# Patient Record
Sex: MALE | Race: BLACK OR AFRICAN AMERICAN | NOT HISPANIC OR LATINO | Employment: FULL TIME | URBAN - METROPOLITAN AREA
[De-identification: names, ages, dates, MRNs, and addresses within clinical notes are randomized per-mention and may not be internally consistent; named-entity substitution may affect disease eponyms.]

---

## 2020-09-26 PROCEDURE — 99285 EMERGENCY DEPT VISIT HI MDM: CPT | Mod: ,,, | Performed by: EMERGENCY MEDICINE

## 2020-09-26 PROCEDURE — 99285 EMERGENCY DEPT VISIT HI MDM: CPT | Mod: 25

## 2020-09-26 PROCEDURE — 99285 PR EMERGENCY DEPT VISIT,LEVEL V: ICD-10-PCS | Mod: ,,, | Performed by: EMERGENCY MEDICINE

## 2020-09-27 ENCOUNTER — HOSPITAL ENCOUNTER (OUTPATIENT)
Facility: HOSPITAL | Age: 45
Discharge: HOME | End: 2020-09-28
Attending: EMERGENCY MEDICINE | Admitting: EMERGENCY MEDICINE
Payer: COMMERCIAL

## 2020-09-27 DIAGNOSIS — R09.02 HYPOXIA: ICD-10-CM

## 2020-09-27 DIAGNOSIS — R07.9 CHEST PAIN: ICD-10-CM

## 2020-09-27 DIAGNOSIS — R59.0 MEDIASTINAL ADENOPATHY: ICD-10-CM

## 2020-09-27 DIAGNOSIS — R06.09 DYSPNEA ON EXERTION: ICD-10-CM

## 2020-09-27 DIAGNOSIS — R06.02 SOB (SHORTNESS OF BREATH): Primary | ICD-10-CM

## 2020-09-27 PROBLEM — Z75.8 DISCHARGE PLANNING ISSUES: Status: ACTIVE | Noted: 2020-09-27

## 2020-09-27 PROBLEM — R91.8 LUNG INFILTRATE ON CT: Status: ACTIVE | Noted: 2020-09-27

## 2020-09-27 PROBLEM — Z79.899 DVT PROPHYLAXIS: Status: ACTIVE | Noted: 2020-09-27

## 2020-09-27 LAB
ALBUMIN SERPL BCP-MCNC: 3.4 G/DL (ref 3.5–5.2)
ALP SERPL-CCNC: 115 U/L (ref 55–135)
ALT SERPL W/O P-5'-P-CCNC: 53 U/L (ref 10–44)
ANION GAP SERPL CALC-SCNC: 12 MMOL/L (ref 8–16)
AST SERPL-CCNC: 47 U/L (ref 10–40)
BASOPHILS # BLD AUTO: 0.05 K/UL (ref 0–0.2)
BASOPHILS NFR BLD: 0.8 % (ref 0–1.9)
BILIRUB SERPL-MCNC: 0.3 MG/DL (ref 0.1–1)
BNP SERPL-MCNC: <10 PG/ML (ref 0–99)
BUN SERPL-MCNC: 14 MG/DL (ref 6–20)
CALCIUM SERPL-MCNC: 9 MG/DL (ref 8.7–10.5)
CALCIUM UR-MCNC: 7.6 MG/DL (ref 0–15)
CHLORIDE SERPL-SCNC: 102 MMOL/L (ref 95–110)
CO2 SERPL-SCNC: 24 MMOL/L (ref 23–29)
CREAT SERPL-MCNC: 0.9 MG/DL (ref 0.5–1.4)
CRP SERPL-MCNC: 9.1 MG/L (ref 0–8.2)
CTP QC/QA: YES
D DIMER PPP IA.FEU-MCNC: 0.7 MG/L FEU
DIFFERENTIAL METHOD: ABNORMAL
EOSINOPHIL # BLD AUTO: 0.3 K/UL (ref 0–0.5)
EOSINOPHIL NFR BLD: 5.2 % (ref 0–8)
ERYTHROCYTE [DISTWIDTH] IN BLOOD BY AUTOMATED COUNT: 13 % (ref 11.5–14.5)
ERYTHROCYTE [SEDIMENTATION RATE] IN BLOOD BY WESTERGREN METHOD: 45 MM/HR (ref 0–23)
EST. GFR  (AFRICAN AMERICAN): >60 ML/MIN/1.73 M^2
EST. GFR  (NON AFRICAN AMERICAN): >60 ML/MIN/1.73 M^2
GLUCOSE SERPL-MCNC: 110 MG/DL (ref 70–110)
HCT VFR BLD AUTO: 44 % (ref 40–54)
HGB BLD-MCNC: 14.1 G/DL (ref 14–18)
IMM GRANULOCYTES # BLD AUTO: 0.01 K/UL (ref 0–0.04)
IMM GRANULOCYTES NFR BLD AUTO: 0.2 % (ref 0–0.5)
LYMPHOCYTES # BLD AUTO: 1.2 K/UL (ref 1–4.8)
LYMPHOCYTES NFR BLD: 18.4 % (ref 18–48)
MCH RBC QN AUTO: 26.9 PG (ref 27–31)
MCHC RBC AUTO-ENTMCNC: 32 G/DL (ref 32–36)
MCV RBC AUTO: 84 FL (ref 82–98)
MONOCYTES # BLD AUTO: 0.8 K/UL (ref 0.3–1)
MONOCYTES NFR BLD: 13.3 % (ref 4–15)
NEUTROPHILS # BLD AUTO: 3.9 K/UL (ref 1.8–7.7)
NEUTROPHILS NFR BLD: 62.1 % (ref 38–73)
NRBC BLD-RTO: 0 /100 WBC
PLATELET # BLD AUTO: 364 K/UL (ref 150–350)
PMV BLD AUTO: 9.6 FL (ref 9.2–12.9)
POCT GLUCOSE: 88 MG/DL (ref 70–110)
POTASSIUM SERPL-SCNC: 4.4 MMOL/L (ref 3.5–5.1)
PROT SERPL-MCNC: 7.6 G/DL (ref 6–8.4)
RBC # BLD AUTO: 5.25 M/UL (ref 4.6–6.2)
SARS-COV-2 RDRP RESP QL NAA+PROBE: NEGATIVE
SODIUM SERPL-SCNC: 138 MMOL/L (ref 136–145)
TROPONIN I SERPL DL<=0.01 NG/ML-MCNC: <0.006 NG/ML (ref 0–0.03)
WBC # BLD AUTO: 6.3 K/UL (ref 3.9–12.7)

## 2020-09-27 PROCEDURE — U0002 COVID-19 LAB TEST NON-CDC: HCPCS | Performed by: STUDENT IN AN ORGANIZED HEALTH CARE EDUCATION/TRAINING PROGRAM

## 2020-09-27 PROCEDURE — 25500020 PHARM REV CODE 255: Performed by: EMERGENCY MEDICINE

## 2020-09-27 PROCEDURE — 85379 FIBRIN DEGRADATION QUANT: CPT

## 2020-09-27 PROCEDURE — 99220 PR INITIAL OBSERVATION CARE,LEVL III: CPT | Mod: ,,, | Performed by: HOSPITALIST

## 2020-09-27 PROCEDURE — 87040 BLOOD CULTURE FOR BACTERIA: CPT

## 2020-09-27 PROCEDURE — G0378 HOSPITAL OBSERVATION PER HR: HCPCS

## 2020-09-27 PROCEDURE — 96372 THER/PROPH/DIAG INJ SC/IM: CPT | Mod: 59

## 2020-09-27 PROCEDURE — 93010 ELECTROCARDIOGRAM REPORT: CPT | Mod: ,,, | Performed by: INTERNAL MEDICINE

## 2020-09-27 PROCEDURE — 99203 PR OFFICE/OUTPT VISIT, NEW, LEVL III, 30-44 MIN: ICD-10-PCS | Mod: ,,, | Performed by: INTERNAL MEDICINE

## 2020-09-27 PROCEDURE — 99220 PR INITIAL OBSERVATION CARE,LEVL III: ICD-10-PCS | Mod: ,,, | Performed by: HOSPITALIST

## 2020-09-27 PROCEDURE — 82164 ANGIOTENSIN I ENZYME TEST: CPT

## 2020-09-27 PROCEDURE — 85652 RBC SED RATE AUTOMATED: CPT

## 2020-09-27 PROCEDURE — 82340 ASSAY OF CALCIUM IN URINE: CPT

## 2020-09-27 PROCEDURE — 93005 ELECTROCARDIOGRAM TRACING: CPT

## 2020-09-27 PROCEDURE — 86140 C-REACTIVE PROTEIN: CPT

## 2020-09-27 PROCEDURE — 63600175 PHARM REV CODE 636 W HCPCS: Performed by: STUDENT IN AN ORGANIZED HEALTH CARE EDUCATION/TRAINING PROGRAM

## 2020-09-27 PROCEDURE — 99203 OFFICE O/P NEW LOW 30 MIN: CPT | Mod: ,,, | Performed by: INTERNAL MEDICINE

## 2020-09-27 PROCEDURE — 80053 COMPREHEN METABOLIC PANEL: CPT

## 2020-09-27 PROCEDURE — 86703 HIV-1/HIV-2 1 RESULT ANTBDY: CPT

## 2020-09-27 PROCEDURE — 84484 ASSAY OF TROPONIN QUANT: CPT

## 2020-09-27 PROCEDURE — 86038 ANTINUCLEAR ANTIBODIES: CPT

## 2020-09-27 PROCEDURE — 83880 ASSAY OF NATRIURETIC PEPTIDE: CPT

## 2020-09-27 PROCEDURE — 93010 EKG 12-LEAD: ICD-10-PCS | Mod: ,,, | Performed by: INTERNAL MEDICINE

## 2020-09-27 PROCEDURE — 85025 COMPLETE CBC W/AUTO DIFF WBC: CPT

## 2020-09-27 RX ORDER — ALBUTEROL SULFATE 90 UG/1
2 AEROSOL, METERED RESPIRATORY (INHALATION) EVERY 4 HOURS PRN
Status: DISCONTINUED | OUTPATIENT
Start: 2020-09-27 | End: 2020-09-28 | Stop reason: HOSPADM

## 2020-09-27 RX ORDER — ENOXAPARIN SODIUM 100 MG/ML
40 INJECTION SUBCUTANEOUS EVERY 24 HOURS
Status: DISCONTINUED | OUTPATIENT
Start: 2020-09-27 | End: 2020-09-28 | Stop reason: HOSPADM

## 2020-09-27 RX ORDER — ALBUTEROL SULFATE 90 UG/1
2 AEROSOL, METERED RESPIRATORY (INHALATION) EVERY 8 HOURS
Status: DISCONTINUED | OUTPATIENT
Start: 2020-09-27 | End: 2020-09-27

## 2020-09-27 RX ORDER — GLUCAGON 1 MG
1 KIT INJECTION
Status: DISCONTINUED | OUTPATIENT
Start: 2020-09-27 | End: 2020-09-28 | Stop reason: HOSPADM

## 2020-09-27 RX ORDER — SODIUM CHLORIDE 0.9 % (FLUSH) 0.9 %
10 SYRINGE (ML) INJECTION
Status: DISCONTINUED | OUTPATIENT
Start: 2020-09-27 | End: 2020-09-28 | Stop reason: HOSPADM

## 2020-09-27 RX ORDER — IBUPROFEN 200 MG
16 TABLET ORAL
Status: DISCONTINUED | OUTPATIENT
Start: 2020-09-27 | End: 2020-09-28 | Stop reason: HOSPADM

## 2020-09-27 RX ORDER — IBUPROFEN 200 MG
24 TABLET ORAL
Status: DISCONTINUED | OUTPATIENT
Start: 2020-09-27 | End: 2020-09-28 | Stop reason: HOSPADM

## 2020-09-27 RX ADMIN — IOHEXOL 75 ML: 350 INJECTION, SOLUTION INTRAVENOUS at 01:09

## 2020-09-27 RX ADMIN — ENOXAPARIN SODIUM 40 MG: 40 INJECTION SUBCUTANEOUS at 05:09

## 2020-09-27 NOTE — ED NOTES
Pt transported to room 603 A on tele box with escort per stretcher  Pt condition stable on leaving the ED, pt belongings are with pt , and pt's wife notified of room number

## 2020-09-27 NOTE — ED PROVIDER NOTES
Encounter Date: 9/26/2020       History     Chief Complaint   Patient presents with    Cough     Pt c/o worsening dry cough and SOB for 1.5 weeks. He was seen in urgent care 9/14 and prescribed steroids and azithromycin (he finished doses). He tested neg for COVID at that time.  Highest temp today 99.3. Denies anti-pyretic use PTAJennifer Gutierrez Jr. is a 45 y.o. male  With no previous past medical history presenting with 2 weeks of dyspnea on exertion and chest tightness.  He reports 2 weeks ago he was seen at urgent care for symptoms and was prescribed with azithromycin and a Medrol Dosepak.  He states that his symptoms have failed to improve over the past 2 weeks and he was seen a tele triage nurse tonight who told him based on his breathing pattern that he needs to report to the ED immediately.  He states that his dyspnea is worse on exertion and he develops weakness and tingling in his extremities when he continues to exert himself.  Prior to 2 weeks ago he states that he was perfectly healthy with no previous episodes of these symptoms.  He is very active and works at [employer].   He states he does not take any daily medicines.  He does have negative for COVID 2 weeks ago in urgent care.  States T-max at home 99.4 earlier today.  He endorses night sweats.  Denies nausea /vomiting, headaches, lower extremity swelling.  Denies palpitations or vision changes.        Review of patient's allergies indicates:  No Known Allergies  History reviewed. No pertinent past medical history.  History reviewed. No pertinent surgical history.  History reviewed. No pertinent family history.  Social History     Tobacco Use    Smoking status: Never Smoker   Substance Use Topics    Alcohol use: Never     Frequency: Never    Drug use: Not on file     Review of Systems   Constitutional: Positive for activity change and fatigue. Negative for chills and fever.   HENT: Negative for congestion, rhinorrhea, sneezing and sore throat.     Eyes: Negative for photophobia and visual disturbance.   Respiratory: Positive for chest tightness and shortness of breath. Negative for cough and wheezing.    Cardiovascular: Negative for palpitations and leg swelling.   Gastrointestinal: Negative for blood in stool, diarrhea, nausea and vomiting.   Endocrine: Negative for polydipsia and polyphagia.   Genitourinary: Negative for dysuria and flank pain.   Musculoskeletal: Negative for arthralgias and back pain.   Skin: Negative for rash and wound.   Allergic/Immunologic: Negative for immunocompromised state.   Neurological: Negative for dizziness, syncope, facial asymmetry, speech difficulty and headaches.   Hematological: Does not bruise/bleed easily.   Psychiatric/Behavioral: Negative for agitation and behavioral problems.       Physical Exam     Initial Vitals [09/26/20 2355]   BP Pulse Resp Temp SpO2   128/76 89 18 98.3 °F (36.8 °C) (!) 94 %      MAP       --         Physical Exam    Nursing note and vitals reviewed.  Constitutional: He appears well-developed and well-nourished. He is not diaphoretic.   HENT:   Mouth/Throat: Oropharynx is clear and moist. No oropharyngeal exudate.   Eyes: EOM are normal. Pupils are equal, round, and reactive to light. No scleral icterus.   Neck: Normal range of motion. Neck supple.   Cardiovascular: Normal rate and regular rhythm.   No murmur heard.  No JVD.    Pulmonary/Chest: Breath sounds normal. No accessory muscle usage or stridor. Tachypnea noted. No respiratory distress. He has no wheezes. He has no rhonchi. He has no rales. He exhibits no tenderness.   Abdominal: Soft. Bowel sounds are normal. He exhibits no distension. There is no abdominal tenderness. There is no rebound.   Musculoskeletal: Normal range of motion. No tenderness or edema.   Neurological: He is alert and oriented to person, place, and time. He has normal strength. No sensory deficit. GCS score is 15. GCS eye subscore is 4. GCS verbal subscore is 5. GCS  motor subscore is 6.   Skin: Skin is warm and dry. Capillary refill takes less than 2 seconds. No pallor.   Psychiatric: He has a normal mood and affect. His behavior is normal. Judgment and thought content normal.         ED Course   Procedures  Labs Reviewed   CBC W/ AUTO DIFFERENTIAL - Abnormal; Notable for the following components:       Result Value    Mean Corpuscular Hemoglobin 26.9 (*)     Platelets 364 (*)     All other components within normal limits   COMPREHENSIVE METABOLIC PANEL - Abnormal; Notable for the following components:    Albumin 3.4 (*)     AST 47 (*)     ALT 53 (*)     All other components within normal limits   D DIMER, QUANTITATIVE - Abnormal; Notable for the following components:    D-Dimer 0.70 (*)     All other components within normal limits   SEDIMENTATION RATE - Abnormal; Notable for the following components:    Sed Rate 45 (*)     All other components within normal limits   C-REACTIVE PROTEIN - Abnormal; Notable for the following components:    CRP 9.1 (*)     All other components within normal limits   TROPONIN I   B-TYPE NATRIURETIC PEPTIDE   CALCIUM, URINE, RANDOM    Narrative:     Specimen Source->Urine   ANGIOTENSIN CONVERTING ENZYME   SARS-COV-2 RDRP GENE   POCT GLUCOSE     EKG Readings: (Independently Interpreted)     Right axis deviation.  No other acute abnormalities noted.     ECG Results          EKG 12-lead (Final result)  Result time 09/27/20 10:43:36    Final result by Interface, Lab In [facility] (09/27/20 10:43:36)                 Narrative:    Test Reason : R07.9,    Vent. Rate : 091 BPM     Atrial Rate : 091 BPM     P-R Int : 146 ms          QRS Dur : 084 ms      QT Int : 364 ms       P-R-T Axes : 050 094 011 degrees     QTc Int : 447 ms    Sinus rhythm with Premature atrial complexes with Aberrant conduction  Rightward axis  Borderline Abnormal ECG  No previous ECGs available  Confirmed by Ellen Hinton MD ([id number]) on 9/27/2020 10:43:25 AM    Referred By: BLANCA    SELF           Confirmed By:Ellen Hinton MD                            Imaging Results          X-Ray Chest AP Portable (Final result)  Result time 09/27/20 05:58:52    Final result by Duane Bourne MD (09/27/20 05:58:52)                 Impression:      Bilateral pulmonary opacities consistent with interstitial and alveolar infiltrates noted, findings consistent with mediastinal and hilar adenopathy noted on CT examination noted, clinical and historical correlation and follow-up is needed, referral to the recent CT report is recommended.      Electronically signed by: Duane Bourne  Date:    09/27/2020  Time:    05:58             Narrative:    EXAMINATION:  XR CHEST AP PORTABLE    CLINICAL HISTORY:  dyspnea on exertion;    TECHNIQUE:  Single frontal view of the chest was performed.    COMPARISON:  CT examination of the chest September 27, 2020    FINDINGS:  Single chest view is submitted.  There is mild diminished depth of inspiration.  There is prominence at the level of the mediastinum and right paratracheal region that may relate to the adenopathy noted on CT examination.  Bilateral pattern of pulmonary opacities consistent with interstitial and alveolar infiltrates noted, somewhat more prominent on the right.  There is no evidence for significant pleural effusion or pneumothorax.  The visualized osseous structures appear intact.  Contrast density within the collecting structures of the kidneys and the ureters may relate to recent CT examination.                                CTA Chest Non-Coronary (PE Study) (Final result)  Result time 09/27/20 05:34:57    Final result by Marc Valadez MD (09/27/20 05:34:57)                 Impression:      1.  No convincing evidence of pulmonary thromboembolism.    2.  Abnormal bulky mediastinal and bilateral hilar lymph node enlargement of uncertain etiology.  Metastatic or lymphoproliferative process must be excluded.  Additional alternative considerations  include autoimmune disorder such as sarcoidosis or reactive lymphadenopathy.    3.  Abnormal appearance of the lungs demonstrating extensive diffuse bilateral micronodular opacities with somewhat more confluent bibasilar consolidation and ground-glass attenuation.  Findings may relate to infectious process, including atypical infection, edema, noninfectious inflammatory process, or neoplastic process (i.e.  lymphangitic carcinomatosis).  Pulmonary consultation advised in light of aforementioned pulmonary findings and significant adenopathy.  Follow-up assessment with dedicated noncontrast high-resolution chest CT may also be of benefit further characterization of lung parenchyma.    This report was flagged in Epic as abnormal.      Electronically signed by: Marc Valadez MD  Date:    09/27/2020  Time:    05:34             Narrative:    EXAMINATION:  CTA CHEST NON CORONARY    CLINICAL HISTORY:  PE suspected, intermediate prob, positive D-dimer;    TECHNIQUE:  Low dose axial images, sagittal and coronal reformations were obtained from the thoracic inlet to the lung bases following the IV administration of 75 mL of Omnipaque 350.  Contrast timing was optimized to evaluate the pulmonary arteries.  MIP images were performed.    COMPARISON:  None    FINDINGS:  The visualized soft tissue structures at the base of the neck are unremarkable.    The thoracic aorta maintains normal caliber, contour, and course without significant atherosclerotic calcification within its course.  There is no evidence of aneurysmal dilation or dissection. The heart is not enlarged and there is no evidence of pericardial effusion.The esophagus maintains a normal course and caliber.There are multiple abnormally enlarged mediastinal and bilateral hilar lymph node enlargement.  For example, there is an enlarged 2.0 cm right pretracheal lymph node (axial series 3, image 31) and an enlarged left hilar lymph node or a conglomeration of lymph nodes  measuring 3.1 cm (axial series 3, image 41).  No bulky axillary lymph node enlargement appreciated.    The trachea is midline and proximal airways are patent. Please note detailed assessment of the lung parenchyma is limited by respiratory motion and CTA technique.  The lungs are markedly abnormal in appearance.  There are extensive bilateral micronodular opacities with more confluent bibasilar consolidation, most pronounced at the right lung base.  There are superimposed diffuse ground-glass opacities with a bibasilar predominance.  There is a more focal 0.7 cm left upper lobe nodular opacity (axial series 3, image 30) as well as an additional more discrete 1.0 cm nodular opacity abutting the fissure within the left lower lobe (axial series 3, image 49).  There is no pneumothorax.  There is no significant pleural effusion.    There is no convincing evidence of pulmonary thromboembolism.  There is mass effect upon the left upper lobe are artery secondary to bulky hilar lymph node enlargement.    Limited images of the upper abdomen obtained during the course of this dedicated thoracic CT demonstrate no acute abnormalities.  No definite bulky adenopathy identified in the visualized upper abdomen.    The osseous structures are unremarkable.                                 Medical Decision Making:   History:   Old Medical Records: I decided to obtain old medical records.  Old Records Summarized: records from clinic visits and records from previous admission(s).       <> Summary of Records: COVID neg on 9/16  Initial Assessment:   Quinton Gutierrez Jr. is a 45 y.o. male  With no previous past medical history presenting with 2 weeks of dyspnea on exertion and chest tightness.   Differential Diagnosis:   Pneumonia  NSTEMI  PE  CHF   Pleural effusion  Independently Interpreted Test(s):   I have ordered and independently interpreted X-rays - see summary below.       <> Summary of X-Ray Reading(s): Bilateral PNA  I have ordered and  independently interpreted EKG Reading(s) - see summary below       <> Summary of EKG Reading(s): Sinus 91 with PACs, no ischemic changes  Clinical Tests:   Lab Tests: Ordered and Reviewed  Radiological Study: Ordered and Reviewed  Medical Tests: Ordered and Reviewed  ED Management:  Upon presentation to the ED patient was hemodynamically stable.  Satting at 94% on room air and clearly tachypneic.  Patient seen and examined at bedside.  Wife provides majority of collateral history due to patient becoming short of breath with conversation.  Workup initiated for dyspnea -chest x-ray, repeat COVID testing, CBC, CMP, D-dimer to rule out PE.      Update:  Troponin and BNP negative.  D-dimer elevated, CTA chest ordered.   Patient was walked in the hallway with O2 monitor - O2 sats dropped from 93% to 89% while walking 10 m. Further care signed out to Dr. Ashford and Dr. Aly.    PGY4 UPDATE:  CTA shows no PE. There is significant reticulonodular disease with a long differential including inflammatory process (atypical viral infection), autoimmune, or miliary TB. He is sating mid 90s on RA at rest and is comfortable. With ambulation he does desat to upper 80s. Hosp med consulted for admission and inpt pulm consult. TB precautions discussed with RN and admitting team.    Yovanny Aly MD  Resident, PGY-4  9/29/2020 5:22 AM                Attending Attestation:   Physician Attestation Statement for Resident:  As the supervising MD   Physician Attestation Statement: I have personally seen and examined this patient.   I agree with the above history. -:   As the supervising MD I agree with the above PE.   -: NAD, VSS, no LAD, faint scattered rhonchi b/l but no dyspnea, RRR no mrg, no LE edema   As the supervising MD I agree with the above treatment, course, plan, and disposition.  I have reviewed and agree with the residents interpretation of the following: lab data, CT scans and x-rays.  I have reviewed the following:  old records at this facility.            MDM Complexity: HIGH                  Clinical Impression:       ICD-10-CM ICD-9-CM   1. SOB (shortness of breath)  R06.02 786.05   2. Dyspnea on exertion  R06.09 786.09   3. Chest pain  R07.9 786.50   4. Mediastinal adenopathy  R59.0 785.6   5. Hypoxia  R09.02 799.02                      Disposition:   Disposition: Admitted  Condition: Fair     ED Disposition Condition    Observation                             Yovanny Aly MD  Resident  09/27/20 0523       Teagan Ashford MD  09/29/20 0013

## 2020-09-27 NOTE — SUBJECTIVE & OBJECTIVE
History reviewed. No pertinent past medical history.    History reviewed. No pertinent surgical history.    Review of patient's allergies indicates:  No Known Allergies    Family History     None        Tobacco Use    Smoking status: Never Smoker   Substance and Sexual Activity    Alcohol use: Never     Frequency: Never    Drug use: Not on file    Sexual activity: Not on file         Review of Systems   Constitutional: Positive for activity change, chills, diaphoresis and unexpected weight change.   HENT: Negative.    Eyes: Negative.    Respiratory: Positive for cough and shortness of breath.    Cardiovascular: Negative.    Gastrointestinal: Negative.    Endocrine: Negative.    Genitourinary: Negative.    Musculoskeletal: Negative.    Allergic/Immunologic: Negative.    Neurological: Negative.    Hematological: Negative.    Psychiatric/Behavioral: Negative.      Objective:     Vital Signs (Most Recent):  Temp: 98.4 °F (36.9 °C) (09/27/20 1419)  Pulse: 68 (09/27/20 1419)  Resp: 16 (09/27/20 1419)  BP: 119/74 (09/27/20 1419)  SpO2: (!) 94 % (09/27/20 1419) Vital Signs (24h Range):  Temp:  [97.8 °F (36.6 °C)-99.2 °F (37.3 °C)] 98.4 °F (36.9 °C)  Pulse:  [67-89] 68  Resp:  [16-24] 16  SpO2:  [93 %-96 %] 94 %  BP: (110-133)/(69-81) 119/74     Weight: 70.8 kg (156 lb)  Body mass index is 25.18 kg/m².      Intake/Output Summary (Last 24 hours) at 9/27/2020 1448  Last data filed at 9/27/2020 1445  Gross per 24 hour   Intake --   Output 275 ml   Net -275 ml       Physical Exam  Constitutional:       General: He is not in acute distress.     Appearance: Normal appearance.   HENT:      Head: Atraumatic.      Nose: Nose normal. No congestion.      Mouth/Throat:      Mouth: Mucous membranes are moist.      Pharynx: No oropharyngeal exudate or posterior oropharyngeal erythema.   Eyes:      General: No scleral icterus.     Conjunctiva/sclera: Conjunctivae normal.   Neck:      Musculoskeletal: Normal range of motion. No muscular  tenderness.   Cardiovascular:      Rate and Rhythm: Regular rhythm.   Pulmonary:      Effort: Pulmonary effort is normal. No respiratory distress.      Breath sounds: Rhonchi present. No rales.      Comments: Diffuse crackles/rhonchi throughout lung fields  Abdominal:      General: Abdomen is flat.      Palpations: Abdomen is soft.   Musculoskeletal: Normal range of motion.         General: No swelling or deformity.   Lymphadenopathy:      Cervical: No cervical adenopathy.   Skin:     General: Skin is warm and dry.      Capillary Refill: Capillary refill takes less than 2 seconds.      Findings: No rash.   Neurological:      General: No focal deficit present.      Mental Status: He is alert and oriented to person, place, and time.   Psychiatric:         Mood and Affect: Mood normal.         Behavior: Behavior normal.       Significant Labs:    CBC/Anemia Profile:  Recent Labs   Lab 09/27/20  0048   WBC 6.30   HGB 14.1   HCT 44.0   *   MCV 84   RDW 13.0        Chemistries:  Recent Labs   Lab 09/27/20 0048      K 4.4      CO2 24   BUN 14   CREATININE 0.9   CALCIUM 9.0   ALBUMIN 3.4*   PROT 7.6   BILITOT 0.3   ALKPHOS 115   ALT 53*   AST 47*       Significant Imaging:   reviewed

## 2020-09-27 NOTE — CONSULTS
[facility]  Pulmonology  Consult Note    Patient Name: Quinton Gutierrez Jr.  MRN: [id number]  Admission Date: 9/27/2020  Hospital Length of Stay: 0 days  Code Status: Full Code  Attending Physician: No att. providers found  Primary Care Provider: Primary Doctor No   Principal Problem: Lung infiltrate on CT    Inpatient consult to Pulmonology  Consult performed by: Maureen Mahoney MD  Consult ordered by: Gemini Herrera MD        Subjective:     HPI:  Mr. Gutierrez is a 46 yo M with no significant past medical history who presented to the ED with cough, night sweats and ross loss of 40 lbs. Symptoms have been worsening over the past few months. He denies any family hx of cancer, denies any risk factors for TB (known close contact, known HIV, time incarcerated, time spent in a shelter), denies any smoking history, denies inhalational exposures at work or at home. 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 His COVID-19 test has been negative 2x recently, and a CT done in the ED showed extensive diffuse bilateral micronodular opacities with somewhat more confluent bibasilar consolidation and ground-glass attenuation, as well as bulky mediastinal and hilar LAD. His vitals were significant for sats of 95% on RA, labs notable for grossly normal CBC/CMP, with elevated ESR/CRP.     History reviewed. No pertinent past medical history.    History reviewed. No pertinent surgical history.    Review of patient's allergies indicates:  No Known Allergies    Family History     None        Tobacco Use    Smoking status: Never Smoker   Substance and Sexual Activity    Alcohol use: Never     Frequency: Never    Drug use: Not on file    Sexual activity: Not on file         Review of Systems   Constitutional: Positive for activity change, chills, diaphoresis and unexpected weight change.   HENT: Negative.    Eyes: Negative.    Respiratory: Positive for  cough and shortness of breath.    Cardiovascular: Negative.    Gastrointestinal: Negative.    Endocrine: Negative.    Genitourinary: Negative.    Musculoskeletal: Negative.    Allergic/Immunologic: Negative.    Neurological: Negative.    Hematological: Negative.    Psychiatric/Behavioral: Negative.      Objective:     Vital Signs (Most Recent):  Temp: 98.4 °F (36.9 °C) (09/27/20 1419)  Pulse: 68 (09/27/20 1419)  Resp: 16 (09/27/20 1419)  BP: 119/74 (09/27/20 1419)  SpO2: (!) 94 % (09/27/20 1419) Vital Signs (24h Range):  Temp:  [97.8 °F (36.6 °C)-99.2 °F (37.3 °C)] 98.4 °F (36.9 °C)  Pulse:  [67-89] 68  Resp:  [16-24] 16  SpO2:  [93 %-96 %] 94 %  BP: (110-133)/(69-81) 119/74     Weight: 70.8 kg (156 lb)  Body mass index is 25.18 kg/m².      Intake/Output Summary (Last 24 hours) at 9/27/2020 1448  Last data filed at 9/27/2020 1445  Gross per 24 hour   Intake --   Output 275 ml   Net -275 ml       Physical Exam  Constitutional:       General: He is not in acute distress.     Appearance: Normal appearance.   HENT:      Head: Atraumatic.      Nose: Nose normal. No congestion.      Mouth/Throat:      Mouth: Mucous membranes are moist.      Pharynx: No oropharyngeal exudate or posterior oropharyngeal erythema.   Eyes:      General: No scleral icterus.     Conjunctiva/sclera: Conjunctivae normal.   Neck:      Musculoskeletal: Normal range of motion. No muscular tenderness.   Cardiovascular:      Rate and Rhythm: Regular rhythm.   Pulmonary:      Effort: Pulmonary effort is normal. No respiratory distress.      Breath sounds: Rhonchi present. No rales.      Comments: Diffuse crackles/rhonchi throughout lung fields  Abdominal:      General: Abdomen is flat.      Palpations: Abdomen is soft.   Musculoskeletal: Normal range of motion.         General: No swelling or deformity.   Lymphadenopathy:      Cervical: No cervical adenopathy.   Skin:     General: Skin is warm and dry.      Capillary Refill: Capillary refill takes less  than 2 seconds.      Findings: No rash.   Neurological:      General: No focal deficit present.      Mental Status: He is alert and oriented to person, place, and time.   Psychiatric:         Mood and Affect: Mood normal.         Behavior: Behavior normal.       Significant Labs:    CBC/Anemia Profile:  Recent Labs   Lab 09/27/20  0048   WBC 6.30   HGB 14.1   HCT 44.0   *   MCV 84   RDW 13.0        Chemistries:  Recent Labs   Lab 09/27/20 0048      K 4.4      CO2 24   BUN 14   CREATININE 0.9   CALCIUM 9.0   ALBUMIN 3.4*   PROT 7.6   BILITOT 0.3   ALKPHOS 115   ALT 53*   AST 47*       Significant Imaging:   reviewed    Assessment/Plan:     No new Assessment & Plan notes have been filed under this hospital service since the last note was generated.  Service: Pulmonology    As above, Mr. Gutierrez presents with several months of worsening B-symptoms and dramatic findings on CT, concerning for infectious, malignant, or rheumatologic disease. He has no known risk factors for TB, but should be ruled out with sputum x3 and screened for HIV to identify additional risk. More concerning with his history of symptoms and CT findings is the possibility of lymphangitic carcinomatosis, from a primary malignancy such as colon, prostate, or thyroid cancer. Screening labs should be sent for these malignancies. Once AFB status is determined from sputum, our team can proceed to bronch vs EBUS to biopsy nodules and LAD. Another less likely possibility is nodular sarcoid, but this level of B symptoms would be unexpected. I discussed each of these possibilities with the patient and his wife, and showed them the areas of concern on his imaging.     Recommendations:  -rule out TB by sputum x3, MTB PCR; can order induced sputum if needed  -keep on airborne precautions   -please order HIV, PSA, TSH, CA 19-9, CEA  -once TB ruled out, we will schedule bronch vs EBUS for tissue sample and BAL      Thank you for your consult. I  will follow-up with patient. Please contact us if you have any additional questions.     Maureen Mahoney MD  Pulmonology  [facility]

## 2020-09-27 NOTE — ED NOTES
IM 5 team aware that Quantiferon Gold TB cannot be collected and processed at this time. MD verbalized understanding. No new orders at this time.

## 2020-09-27 NOTE — H&P
"[facility] Hospital Medicine  History & Physical    Patient Name: Quinton Gutierrez Jr.  MRN: [id number]  Admission Date: 9/27/2020  Attending Physician: Yaa att. providers found   Primary Care Provider: Primary Doctor Yaa    [facility] Medicine Team: [facility] MED 5 Gemini Herrera MD     Patient information was obtained from patient and ER records.     Subjective:     Principal Problem:Lung infiltrate on CT    Chief Complaint:   Chief Complaint   Patient presents with    Cough     Pt c/o worsening dry cough and SOB for 1.5 weeks. He was seen in urgent care 9/14 and prescribed steroids and azithromycin (he finished doses). He tested neg for COVID at that time.  Highest temp today 99.3. Denies anti-pyretic use PTA.         HPI: Mr Gutierrez is a 44 yo M with no significant past medical history who presents with a chief complaint of chest tightness and dyspnea on exertion. 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 These symptoms were preceded by the onset of night sweats approximately 1 month ago, which he quantifies as waking up with his shirt "pretty soaked." He also reports unintentionally losing around 40 lbs in the last month. He used to weigh 195 lbs, and now he weighs 150s despite having a good appetite and eating his normal amount (snacking throughout the day with 1 main meal at night). He also reports mild headache, diffuse body aches, and polyuria. He denies any hemoptysis, hematemesis, melena, diarrhea, unusual rashes, joint swelling, vision changes, dizziness, gait or balance issues.     He presented to an urgent care on 9/14 and was given prescription for azithromycin and steroids, which did not relieve his symptoms. COVID test was negative at that time. A visit with a telehealth nurse just prior to admission advised him to go to the ED for evaluation. He " reports he has always been very healthy, denies incarceration history, denies pertinent family history in parents, siblings, or his two children. He does not use tobacco, abuse alcohol now or in the past, or use street drugs.     History reviewed. No pertinent past medical history.    History reviewed. No pertinent surgical history.    Review of patient's allergies indicates:  No Known Allergies    No current facility-administered medications on file prior to encounter.      No current outpatient medications on file prior to encounter.     Family History     None        Tobacco Use    Smoking status: Never Smoker   Substance and Sexual Activity    Alcohol use: Never     Frequency: Never    Drug use: Not on file    Sexual activity: Not on file     Review of Systems   Constitutional: Positive for unexpected weight change. Negative for appetite change.        Positive for night sweats   HENT:        Negative for sinus infections   Eyes:        Negative for blurred vision, negative for change in vision   Respiratory: Positive for cough, chest tightness and shortness of breath.    Cardiovascular: Negative for leg swelling.   Gastrointestinal: Negative for blood in stool, constipation, diarrhea, nausea and vomiting.   Genitourinary: Positive for frequency. Negative for hematuria.   Musculoskeletal: Positive for myalgias. Negative for gait problem and joint swelling.   Skin: Negative for rash.   Neurological: Positive for headaches. Negative for dizziness.     Objective:     Vital Signs (Most Recent):  Temp: 97.8 °F (36.6 °C) (09/27/20 0735)  Pulse: 82 (09/27/20 0735)  Resp: 20 (09/27/20 0735)  BP: 131/70 (09/27/20 0735)  SpO2: 96 % (09/27/20 0735) Vital Signs (24h Range):  Temp:  [97.8 °F (36.6 °C)-99.2 °F (37.3 °C)] 97.8 °F (36.6 °C)  Pulse:  [76-89] 82  Resp:  [18-24] 20  SpO2:  [93 %-96 %] 96 %  BP: (114-131)/(69-76) 131/70     Weight: 70.8 kg (156 lb)  Body mass index is 25.18 kg/m².    Physical Exam  Vitals signs  "reviewed.   Constitutional:       General: He is not in acute distress.     Appearance: He is normal weight.   HENT:      Head: Normocephalic.      Mouth/Throat:      Mouth: Mucous membranes are moist.      Pharynx: No oropharyngeal exudate or posterior oropharyngeal erythema.   Eyes:      General: No scleral icterus.        Right eye: No discharge.         Left eye: No discharge.      Comments: Pupils equally round   Cardiovascular:      Rate and Rhythm: Normal rate and regular rhythm.      Heart sounds: No murmur.   Pulmonary:      Effort: Pulmonary effort is normal.      Breath sounds: No wheezing.      Comments: Decreased breath sounds diffusely  Abdominal:      General: There is no distension.      Palpations: Abdomen is soft.      Tenderness: There is no abdominal tenderness.   Musculoskeletal:         General: No swelling (no swelling to joints of hands) or tenderness.      Right lower leg: No edema.      Left lower leg: No edema.   Skin:     General: Skin is warm and dry.      Findings: No rash.      Comments: Fingernails normal in shape, without lesions   Neurological:      Mental Status: He is alert and oriented to person, place, and time.   Psychiatric:         Mood and Affect: Mood normal.         Behavior: Behavior normal.             Significant Labs:   CBC:   Recent Labs   Lab 09/27/20 0048   WBC 6.30   HGB 14.1   HCT 44.0   *     CMP:   Recent Labs   Lab 09/27/20 0048      K 4.4      CO2 24      BUN 14   CREATININE 0.9   CALCIUM 9.0   PROT 7.6   ALBUMIN 3.4*   BILITOT 0.3   ALKPHOS 115   AST 47*   ALT 53*   ANIONGAP 12   EGFRNONAA >60.0     Cardiac Markers:   Recent Labs   Lab 09/27/20 0048   BNP <10     Lactic Acid: No results for input(s): LACTATE in the last 48 hours.  Troponin:   Recent Labs   Lab 09/27/20 0048   TROPONINI <0.006       Significant Imaging: CXR: "Bilateral pulmonary opacities consistent with interstitial and alveolar infiltrates noted, findings " "consistent with mediastinal and hilar adenopathy noted on CT examination noted, clinical and historical correlation and follow-up is needed, referral to the recent CT report is recommended"    CT Chest PE Protocol:  "1.  No convincing evidence of pulmonary thromboembolism.     2.  Abnormal bulky mediastinal and bilateral hilar lymph node enlargement of uncertain etiology.  Metastatic or lymphoproliferative process must be excluded.  Additional alternative considerations include autoimmune disorder such as sarcoidosis or reactive lymphadenopathy.     3.  Abnormal appearance of the lungs demonstrating extensive diffuse bilateral micronodular opacities with somewhat more confluent bibasilar consolidation and ground-glass attenuation.  Findings may relate to infectious process, including atypical infection, edema, noninfectious inflammatory process, or neoplastic process (i.e.  lymphangitic carcinomatosis).  Pulmonary consultation advised in light of aforementioned pulmonary findings and significant adenopathy.  Follow-up assessment with dedicated noncontrast high-resolution chest CT may also be of benefit further characterization of lung parenchyma."    Assessment/Plan:     * Bilateral Lung infiltrate on CT  Patient with "extensive diffuse bilateral micronodular opacities with somewhat more confluent bibasilar consolidation and ground-glass attenuation." Accompanied by bilateral hilar lymphadenopathy on CXR, in the setting of recent dyspnea on exertion, dry cough, night sweats, and 40lb weightloss in 1.5 months, there is concern for systemic disease such as miliary TB or sarcoidosis. He does not appear to have TB risk factors, and no pertinent family history.   Given his lack of fever or leukocytosis, stable vital signs, low concern for pneumonia at this time, therefore deferred beginning antibiotics until further workup completed.     - NPO until evaluated by pulmonary in case bronchoscopy is warranted  - F/u COVID  - " F/u quantiferon gold, AFB sputum cultures, regular sputum cultures  - F/u JUAN, ACE, HIV, urine Ca  - Consider dedicated noncontrast high-resolution CT chest to further evaluate  - Airborne isolation until TB/COVID ruled out  - Consulted pulmonology, appreciate recs      Discharge planning issues  Patient will likely need close followup with pulmonology, ID, and/or rheumatology after discharge to complete workup for condition.       DVT prophylaxis  Lovenox 40mg subQ daily      SOB (shortness of breath)  Patient's presenting symptom was dyspnea on exertion. Maintaining appropriate O2 saturation on room air currently.   D dimer was elevated in ED to 0.7, CTA obtained and shows no evidence of PE.  BNP and Troponin wnl.     - Continuous pulse oximetry  - Will supplement O2 via nasal cannula if needed        VTE Risk Mitigation (From admission, onward)         Ordered     enoxaparin injection 40 mg  Every 24 hours      09/27/20 0514                   Gemini Herrera MD  Department of Hospital Medicine   [facility]

## 2020-09-27 NOTE — HPI
Mr. Gutierrez is a 44 yo M with no significant past medical history who presented to the ED with cough, night sweats and ross loss of 40 lbs. Symptoms have been worsening over the past few months. He denies any family hx of cancer, denies any risk factors for TB (known close contact, known HIV, time incarcerated, time spent in a shelter), denies any smoking history, denies inhalational exposures at work or at home. 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 His COVID-19 test has been negative 2x recently, and a CT done in the ED showed extensive diffuse bilateral micronodular opacities with somewhat more confluent bibasilar consolidation and ground-glass attenuation, as well as bulky mediastinal and hilar LAD. His vitals were significant for sats of 95% on RA, labs notable for grossly normal CBC/CMP, with elevated ESR/CRP.

## 2020-09-27 NOTE — ED NOTES
Pt moved to ED bed 19 at this time for negative pressure due to rule out TB status. Isolation precautions maintained. Report received from NAVJOT Bill. Care assumed. Pt changed into hospital gown. Personal belongings placed in bag at bedside and are labeled. Pt placed on continuous cardiac monitor, automated BP cuff, and continuous pulse oximeter. Oxygen saturation 94% on room air. Pt respirations even and unlabored. Pt denies feeling SOB at this time and denies CP. Pt denies complaints. Pt aware of POC for admission and all questions and concerns addressed with patient. Pt educated on need for sputum sample. Pt denies having a productive cough. Specimen container placed at bedside. Pt educated on need for urine sample. Pt reports that he voided prior to arrival to ED room 19 and does not have to void at this time. Pt aware urine sample is needed. Urinal placed at bedside. Pt oriented to call bell and verbalized he would call for assistance. Side rails raised x2, bed locked and low.

## 2020-09-27 NOTE — ED NOTES
Spoke to Myranda in lab to request lab tubes for Quantiferon Gold TB specimens. Lab stated specimens are not collected or processed on Sundays. Stated specimen will have to be collected on Monday at 0800. [facility] med 5 team paged to notify. Will inform RN assuming care at 0700.

## 2020-09-27 NOTE — ED NOTES
Coty in WAR room notified tele box 603 A was placed on pt  NSR with heart rate 75 bpm confirmed by Coty

## 2020-09-27 NOTE — ASSESSMENT & PLAN NOTE
"Patient with "extensive diffuse bilateral micronodular opacities with somewhat more confluent bibasilar consolidation and ground-glass attenuation." Accompanied by bilateral hilar lymphadenopathy on CXR, in the setting of recent dyspnea on exertion, dry cough, night sweats, and 40lb weightloss in 1.5 months, there is concern for systemic disease such as miliary TB or sarcoidosis. He does not appear to have TB risk factors, and no pertinent family history.   Given his lack of fever or leukocytosis, stable vital signs, low concern for pneumonia at this time, therefore deferred beginning antibiotics until further workup completed.     - NPO until evaluated by pulmonary in case bronchoscopy is warranted  - F/u COVID  - F/u quantiferon gold, AFB sputum cultures, regular sputum cultures  - F/u JUAN, ACE, HIV, urine Ca  - Consider dedicated noncontrast high-resolution CT chest to further evaluate  - Airborne isolation until TB/COVID ruled out  - Consulted pulmonology, appreciate recs    "

## 2020-09-27 NOTE — SUBJECTIVE & OBJECTIVE
History reviewed. No pertinent past medical history.    History reviewed. No pertinent surgical history.    Review of patient's allergies indicates:  No Known Allergies    No current facility-administered medications on file prior to encounter.      No current outpatient medications on file prior to encounter.     Family History     None        Tobacco Use    Smoking status: Never Smoker   Substance and Sexual Activity    Alcohol use: Never     Frequency: Never    Drug use: Not on file    Sexual activity: Not on file     Review of Systems   Constitutional: Positive for unexpected weight change. Negative for appetite change.        Positive for night sweats   HENT:        Negative for sinus infections   Eyes:        Negative for blurred vision, negative for change in vision   Respiratory: Positive for cough, chest tightness and shortness of breath.    Cardiovascular: Negative for leg swelling.   Gastrointestinal: Negative for blood in stool, constipation, diarrhea, nausea and vomiting.   Genitourinary: Positive for frequency. Negative for hematuria.   Musculoskeletal: Positive for myalgias. Negative for gait problem and joint swelling.   Skin: Negative for rash.   Neurological: Positive for headaches. Negative for dizziness.     Objective:     Vital Signs (Most Recent):  Temp: 97.8 °F (36.6 °C) (09/27/20 0735)  Pulse: 82 (09/27/20 0735)  Resp: 20 (09/27/20 0735)  BP: 131/70 (09/27/20 0735)  SpO2: 96 % (09/27/20 0735) Vital Signs (24h Range):  Temp:  [97.8 °F (36.6 °C)-99.2 °F (37.3 °C)] 97.8 °F (36.6 °C)  Pulse:  [76-89] 82  Resp:  [18-24] 20  SpO2:  [93 %-96 %] 96 %  BP: (114-131)/(69-76) 131/70     Weight: 70.8 kg (156 lb)  Body mass index is 25.18 kg/m².    Physical Exam  Vitals signs reviewed.   Constitutional:       General: He is not in acute distress.     Appearance: He is normal weight.   HENT:      Head: Normocephalic.      Mouth/Throat:      Mouth: Mucous membranes are moist.      Pharynx: No  "oropharyngeal exudate or posterior oropharyngeal erythema.   Eyes:      General: No scleral icterus.        Right eye: No discharge.         Left eye: No discharge.      Comments: Pupils equally round   Cardiovascular:      Rate and Rhythm: Normal rate and regular rhythm.      Heart sounds: No murmur.   Pulmonary:      Effort: Pulmonary effort is normal.      Breath sounds: No wheezing.      Comments: Decreased breath sounds diffusely  Abdominal:      General: There is no distension.      Palpations: Abdomen is soft.      Tenderness: There is no abdominal tenderness.   Musculoskeletal:         General: No swelling (no swelling to joints of hands) or tenderness.      Right lower leg: No edema.      Left lower leg: No edema.   Skin:     General: Skin is warm and dry.      Findings: No rash.      Comments: Fingernails normal in shape, without lesions   Neurological:      Mental Status: He is alert and oriented to person, place, and time.   Psychiatric:         Mood and Affect: Mood normal.         Behavior: Behavior normal.             Significant Labs:   CBC:   Recent Labs   Lab 09/27/20 0048   WBC 6.30   HGB 14.1   HCT 44.0   *     CMP:   Recent Labs   Lab 09/27/20 0048      K 4.4      CO2 24      BUN 14   CREATININE 0.9   CALCIUM 9.0   PROT 7.6   ALBUMIN 3.4*   BILITOT 0.3   ALKPHOS 115   AST 47*   ALT 53*   ANIONGAP 12   EGFRNONAA >60.0     Cardiac Markers:   Recent Labs   Lab 09/27/20 0048   BNP <10     Lactic Acid: No results for input(s): LACTATE in the last 48 hours.  Troponin:   Recent Labs   Lab 09/27/20 0048   TROPONINI <0.006       Significant Imaging: CXR: "Bilateral pulmonary opacities consistent with interstitial and alveolar infiltrates noted, findings consistent with mediastinal and hilar adenopathy noted on CT examination noted, clinical and historical correlation and follow-up is needed, referral to the recent CT report is recommended"    CT Chest PE Protocol:  "1.  No " "convincing evidence of pulmonary thromboembolism.     2.  Abnormal bulky mediastinal and bilateral hilar lymph node enlargement of uncertain etiology.  Metastatic or lymphoproliferative process must be excluded.  Additional alternative considerations include autoimmune disorder such as sarcoidosis or reactive lymphadenopathy.     3.  Abnormal appearance of the lungs demonstrating extensive diffuse bilateral micronodular opacities with somewhat more confluent bibasilar consolidation and ground-glass attenuation.  Findings may relate to infectious process, including atypical infection, edema, noninfectious inflammatory process, or neoplastic process (i.e.  lymphangitic carcinomatosis).  Pulmonary consultation advised in light of aforementioned pulmonary findings and significant adenopathy.  Follow-up assessment with dedicated noncontrast high-resolution chest CT may also be of benefit further characterization of lung parenchyma."  "

## 2020-09-27 NOTE — PLAN OF CARE
WILL CONTINUE WITH PLAN OF CARE'S INTERVENTIONS TOWARDS GOALS FOR PATIENT'S  OPTIMAL HEALTH:    NO FALLS  AIRBORNE PRECAUTIONS INITIATED  ORDERS NOTED AND IMPLEMENTED   PATIENT EDUCATION AND SUPPORT OFFERED  AWAITING QUANTIFERON GOLD TEST ON 9/28/20

## 2020-09-27 NOTE — ASSESSMENT & PLAN NOTE
Patient's presenting symptom was dyspnea on exertion. Maintaining appropriate O2 saturation on room air currently.   D dimer was elevated in ED to 0.7, CTA obtained and shows no evidence of PE.  BNP and Troponin wnl.     - Continuous pulse oximetry  - Will supplement O2 via nasal cannula if needed

## 2020-09-27 NOTE — ASSESSMENT & PLAN NOTE
Patient will likely need close followup with pulmonology, ID, and/or rheumatology after discharge to complete workup for condition.

## 2020-09-27 NOTE — ED NOTES
Pt identifiers Quinton Gutierrez JrJennifer were checked and are correct  LOC: The patient is awake, alert, aware of environment with an appropriate affect. Oriented x4, speaking appropriately  APPEARANCE: Pt resting comfortably, in no acute distress, pt is clean and well groomed, clothing properly fastened  SKIN: Skin warm, dry and intact, normal skin turgor, moist mucus membranes  RESPIRATORY: Airway is open and patent, respirations are spontaneous, even and unlabored, normal effort and rate Breath sounds clear to upper lung fields, crackles auscultated to lower lung fields   CARDIAC: Normal rate and rhythm, no peripheral edema noted, capillary refill < 3 seconds, bilateral radial pulses 2+  ABDOMEN: Soft, nontender, nondistended. Bowel sounds present   NEUROLOGIC:  facial expression is symmetrical, patient moving all extremities spontaneously, normal sensation in all extremities when touched with a finger.  Follows all commands appropriately  MUSCULOSKELETAL: No obvious deformities.

## 2020-09-27 NOTE — ED TRIAGE NOTES
Patient identifiers for Quinton Gutierrez Jr. 45 y.o. male checked and correct.  Chief Complaint   Patient presents with    Cough     Pt c/o worsening dry cough and SOB for 1.5 weeks. He was seen in urgent care 9/14 and prescribed steroids and azithromycin (he finished doses). He tested neg for COVID at that time.  Highest temp today 99.3. Denies anti-pyretic use PTA.      No past medical history on file.  Allergies reported: Review of patient's allergies indicates:  Not on File      LOC: Patient is awake, alert, and aware of environment with an appropriate affect. Patient is oriented x 3 and speaking appropriately.  APPEARANCE: Patient resting comfortably and in no acute distress. Patient is clean and well groomed, patient's clothing is properly fastened.  HEENT: Pt reports cough  SKIN: The skin is warm and dry. Patient has normal skin turgor and moist mucus membranes. Skin is intact; no bruising or breakdown noted.  MUSKULOSKELETAL: Patient is moving all extremities well, no obvious deformities noted. Pulses intact.   RESPIRATORY: Airway is open and patent. Respirations are spontaneous and non-labored with normal effort and rate. Pt reports SOB  CARDIAC: Patient has a normal rate and rhythm. No peripheral edema noted. Pt reports mid sternal chest pain.   ABDOMEN: No distention noted. Bowel sounds active in all 4 quadrants. Soft and non-tender upon palpation.  NEUROLOGICAL:  PERRL. Facial expression is symmetrical. Hand grasps are equal bilaterally. Normal sensation in all extremities when touched with finger.

## 2020-09-27 NOTE — HPI
"Mr Gutierrez is a 46 yo M with no significant past medical history who presents with a chief complaint of chest tightness and dyspnea on exertion. 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 These symptoms were preceded by the onset of night sweats approximately 1 month ago, which he quantifies as waking up with his shirt "pretty soaked." He also reports unintentionally losing around 40 lbs in the last month. He used to weigh 195 lbs, and now he weighs 150s despite having a good appetite and eating his normal amount (snacking throughout the day with 1 main meal at night). He also reports mild headache, diffuse body aches, and polyuria. He denies any hemoptysis, hematemesis, melena, diarrhea, unusual rashes, joint swelling, vision changes, dizziness, gait or balance issues.     He presented to an urgent care on 9/14 and was given prescription for azithromycin and steroids, which did not relieve his symptoms. COVID test was negative at that time. A visit with a telehealth nurse just prior to admission advised him to go to the ED for evaluation. He reports he has always been very healthy, denies incarceration history, denies pertinent family history in parents, siblings, or his two children. He does not use tobacco, abuse alcohol now or in the past, or use street drugs.   "

## 2020-09-28 VITALS
SYSTOLIC BLOOD PRESSURE: 117 MMHG | OXYGEN SATURATION: 96 % | DIASTOLIC BLOOD PRESSURE: 66 MMHG | RESPIRATION RATE: 16 BRPM | WEIGHT: 173.75 LBS | HEIGHT: 66 IN | BODY MASS INDEX: 27.92 KG/M2 | TEMPERATURE: 98 F | HEART RATE: 99 BPM

## 2020-09-28 PROBLEM — R59.0 MEDIASTINAL LYMPHADENOPATHY: Status: ACTIVE | Noted: 2020-09-28

## 2020-09-28 LAB
ALBUMIN SERPL BCP-MCNC: 3 G/DL (ref 3.5–5.2)
ALP SERPL-CCNC: 111 U/L (ref 55–135)
ALT SERPL W/O P-5'-P-CCNC: 57 U/L (ref 10–44)
ANA SER QL IF: NORMAL
ANION GAP SERPL CALC-SCNC: 6 MMOL/L (ref 8–16)
AST SERPL-CCNC: 42 U/L (ref 10–40)
BASOPHILS # BLD AUTO: 0.05 K/UL (ref 0–0.2)
BASOPHILS NFR BLD: 1 % (ref 0–1.9)
BILIRUB SERPL-MCNC: 0.6 MG/DL (ref 0.1–1)
BUN SERPL-MCNC: 13 MG/DL (ref 6–20)
CALCIUM SERPL-MCNC: 8.9 MG/DL (ref 8.7–10.5)
CANCER AG19-9 SERPL-ACNC: 4 U/ML (ref 2–40)
CEA SERPL-MCNC: <1 NG/ML (ref 0–5)
CHLORIDE SERPL-SCNC: 103 MMOL/L (ref 95–110)
CO2 SERPL-SCNC: 27 MMOL/L (ref 23–29)
COMPLEXED PSA SERPL-MCNC: 0.46 NG/ML (ref 0–4)
CREAT SERPL-MCNC: 0.9 MG/DL (ref 0.5–1.4)
DIFFERENTIAL METHOD: ABNORMAL
EOSINOPHIL # BLD AUTO: 0.3 K/UL (ref 0–0.5)
EOSINOPHIL NFR BLD: 6.2 % (ref 0–8)
ERYTHROCYTE [DISTWIDTH] IN BLOOD BY AUTOMATED COUNT: 12.9 % (ref 11.5–14.5)
EST. GFR  (AFRICAN AMERICAN): >60 ML/MIN/1.73 M^2
EST. GFR  (NON AFRICAN AMERICAN): >60 ML/MIN/1.73 M^2
GLUCOSE SERPL-MCNC: 98 MG/DL (ref 70–110)
HCT VFR BLD AUTO: 43.7 % (ref 40–54)
HGB BLD-MCNC: 14 G/DL (ref 14–18)
HIV 1+2 AB+HIV1 P24 AG SERPL QL IA: NEGATIVE
IMM GRANULOCYTES # BLD AUTO: 0.01 K/UL (ref 0–0.04)
IMM GRANULOCYTES NFR BLD AUTO: 0.2 % (ref 0–0.5)
LDH SERPL L TO P-CCNC: 213 U/L (ref 110–260)
LYMPHOCYTES # BLD AUTO: 1 K/UL (ref 1–4.8)
LYMPHOCYTES NFR BLD: 19.6 % (ref 18–48)
MCH RBC QN AUTO: 26.2 PG (ref 27–31)
MCHC RBC AUTO-ENTMCNC: 32 G/DL (ref 32–36)
MCV RBC AUTO: 82 FL (ref 82–98)
MONOCYTES # BLD AUTO: 0.7 K/UL (ref 0.3–1)
MONOCYTES NFR BLD: 13 % (ref 4–15)
NEUTROPHILS # BLD AUTO: 3 K/UL (ref 1.8–7.7)
NEUTROPHILS NFR BLD: 60 % (ref 38–73)
NRBC BLD-RTO: 0 /100 WBC
PLATELET # BLD AUTO: 375 K/UL (ref 150–350)
PMV BLD AUTO: 9.3 FL (ref 9.2–12.9)
POTASSIUM SERPL-SCNC: 4.2 MMOL/L (ref 3.5–5.1)
PROT SERPL-MCNC: 6.6 G/DL (ref 6–8.4)
RBC # BLD AUTO: 5.34 M/UL (ref 4.6–6.2)
SODIUM SERPL-SCNC: 136 MMOL/L (ref 136–145)
TSH SERPL DL<=0.005 MIU/L-ACNC: 1.06 UIU/ML (ref 0.4–4)
WBC # BLD AUTO: 5.01 K/UL (ref 3.9–12.7)

## 2020-09-28 PROCEDURE — 84443 ASSAY THYROID STIM HORMONE: CPT

## 2020-09-28 PROCEDURE — 85025 COMPLETE CBC W/AUTO DIFF WBC: CPT

## 2020-09-28 PROCEDURE — G0378 HOSPITAL OBSERVATION PER HR: HCPCS

## 2020-09-28 PROCEDURE — 36415 COLL VENOUS BLD VENIPUNCTURE: CPT

## 2020-09-28 PROCEDURE — 99217 PR OBSERVATION CARE DISCHARGE: ICD-10-PCS | Mod: ,,, | Performed by: HOSPITALIST

## 2020-09-28 PROCEDURE — 99217 PR OBSERVATION CARE DISCHARGE: CPT | Mod: ,,, | Performed by: HOSPITALIST

## 2020-09-28 PROCEDURE — 86301 IMMUNOASSAY TUMOR CA 19-9: CPT

## 2020-09-28 PROCEDURE — 99214 OFFICE O/P EST MOD 30 MIN: CPT | Mod: ,,, | Performed by: INTERNAL MEDICINE

## 2020-09-28 PROCEDURE — 82378 CARCINOEMBRYONIC ANTIGEN: CPT

## 2020-09-28 PROCEDURE — 99214 PR OFFICE/OUTPT VISIT, EST, LEVL IV, 30-39 MIN: ICD-10-PCS | Mod: ,,, | Performed by: INTERNAL MEDICINE

## 2020-09-28 PROCEDURE — 86480 TB TEST CELL IMMUN MEASURE: CPT

## 2020-09-28 PROCEDURE — 84153 ASSAY OF PSA TOTAL: CPT

## 2020-09-28 PROCEDURE — 83615 LACTATE (LD) (LDH) ENZYME: CPT

## 2020-09-28 PROCEDURE — 80053 COMPREHEN METABOLIC PANEL: CPT

## 2020-09-28 RX ORDER — SODIUM CHLORIDE FOR INHALATION 3 %
4 VIAL, NEBULIZER (ML) INHALATION EVERY 8 HOURS
Status: DISCONTINUED | OUTPATIENT
Start: 2020-09-28 | End: 2020-09-28

## 2020-09-28 RX ORDER — ALBUTEROL SULFATE 90 UG/1
2 AEROSOL, METERED RESPIRATORY (INHALATION) EVERY 4 HOURS PRN
Qty: 8.5 G | Refills: 0 | Status: SHIPPED | OUTPATIENT
Start: 2020-09-28 | End: 2020-09-28 | Stop reason: HOSPADM

## 2020-09-28 NOTE — PLAN OF CARE
Pt up in bed remain on  airborne precaution Pt unable to give sputum no c/o pain noted call button within reach no acute distress noted  Problem: Adult Inpatient Plan of Care  Goal: Plan of Care Review  Outcome: Ongoing, Progressing  Goal: Patient-Specific Goal (Individualization)  Outcome: Ongoing, Progressing  Goal: Absence of Hospital-Acquired Illness or Injury  Outcome: Ongoing, Progressing  Goal: Optimal Comfort and Wellbeing  Outcome: Ongoing, Progressing  Goal: Readiness for Transition of Care  Outcome: Ongoing, Progressing  Goal: Rounds/Family Conference  Outcome: Ongoing, Progressing

## 2020-09-28 NOTE — PROGRESS NOTES
[facility]  Pulmonology  Progress Note    Patient Name: Quinton Gutierrez Jr.  MRN: [id number]  Admission Date: 9/27/2020  Hospital Length of Stay: 0 days  Code Status: Full Code  Attending Provider: Yanely Canales MD  Primary Care Provider: Primary Doctor No   Principal Problem: Lung infiltrate on CT    Subjective:     Interval History: Mr. Gutierrez has done well overnight. He is not producing any sputum and his cough is still intermittent and dry. No fever, chills, nausea, or vomiting.    Objective:     Vital Signs (Most Recent):  Temp: 97.5 °F (36.4 °C) (09/28/20 0750)  Pulse: 83 (09/28/20 0750)  Resp: 16 (09/28/20 0750)  BP: 122/70 (09/28/20 0750)  SpO2: (!) 92 % (09/28/20 0750) Vital Signs (24h Range):  Temp:  [96.2 °F (35.7 °C)-98.4 °F (36.9 °C)] 97.5 °F (36.4 °C)  Pulse:  [67-92] 83  Resp:  [16-18] 16  SpO2:  [90 %-94 %] 92 %  BP: ()/(60-78) 122/70     Weight: 78.8 kg (173 lb 11.6 oz)  Body mass index is 28.04 kg/m².      Intake/Output Summary (Last 24 hours) at 9/28/2020 1149  Last data filed at 9/28/2020 0900  Gross per 24 hour   Intake 360 ml   Output 275 ml   Net 85 ml       Physical Exam  Vitals signs and nursing note reviewed.   Constitutional:       Appearance: Normal appearance.   HENT:      Head: Normocephalic.      Nose: Nose normal.      Mouth/Throat:      Mouth: Mucous membranes are moist.      Pharynx: Oropharynx is clear.   Eyes:      Conjunctiva/sclera: Conjunctivae normal.      Pupils: Pupils are equal, round, and reactive to light.   Neck:      Musculoskeletal: Normal range of motion.   Cardiovascular:      Rate and Rhythm: Normal rate and regular rhythm.      Pulses: Normal pulses.   Pulmonary:      Effort: Pulmonary effort is normal.      Breath sounds: Normal breath sounds.   Abdominal:      General: Abdomen is flat.      Palpations: Abdomen is soft.   Musculoskeletal: Normal range of motion.         General: No swelling, tenderness or deformity.      Right lower leg: No  edema.      Left lower leg: No edema.   Lymphadenopathy:      Cervical: No cervical adenopathy.   Skin:     General: Skin is warm and dry.      Capillary Refill: Capillary refill takes less than 2 seconds.      Findings: No lesion or rash.   Neurological:      General: No focal deficit present.      Mental Status: He is alert and oriented to person, place, and time.   Psychiatric:         Mood and Affect: Mood normal.         Vents:       Lines/Drains/Airways     Peripheral Intravenous Line                 Peripheral IV - Single Lumen 09/27/20 0049 20 G Right Hand 1 day         Peripheral IV - Single Lumen 09/27/20 0853 20 G;1 in Left Hand 1 day                Significant Labs:    CBC/Anemia Profile:  Recent Labs   Lab 09/27/20 0048 09/28/20  0650   WBC 6.30 5.01   HGB 14.1 14.0   HCT 44.0 43.7   * 375*   MCV 84 82   RDW 13.0 12.9        Chemistries:  Recent Labs   Lab 09/27/20 0048 09/28/20  0650    136   K 4.4 4.2    103   CO2 24 27   BUN 14 13   CREATININE 0.9 0.9   CALCIUM 9.0 8.9   ALBUMIN 3.4* 3.0*   PROT 7.6 6.6   BILITOT 0.3 0.6   ALKPHOS 115 111   ALT 53* 57*   AST 47* 42*       All pertinent labs within the past 24 hours have been reviewed.    Significant Imaging:  I have reviewed and interpreted all pertinent imaging results/findings within the past 24 hours.    Assessment/Plan:     * Bilateral Lung infiltrate on CT  Bilateral micronodular disease with some very scant ground glass. This with his symptoms and his mediastinal adenopathy is concerning for sarcoidosis vs lymphoma but most likely sarcoid.   - very low suspicion for TB given his lack of risk factors and the type of lung disease present.   - will plan to get him scheduled for EBUS as soon as we can with Dr. De Los Santos (she is reviewing her schedule today) Plan for either tomorrow or he can come back as an outpatient.   - no need for any specific treatments currently.     Mediastinal lymphadenopathy  Concerning for sarcoid vs  lympohoma  - less concern for infectious cause.   - EBUS as above when able to schedule.     SOB (shortness of breath)  He denies any current shortness of breath. Continue to monitor for now.            Courtney Arrington MD  Pulmonology  [facility]

## 2020-09-28 NOTE — HOSPITAL COURSE
Bilateral micronodular disease with some very scant ground glass was seen on imaging. Pulmonology consulted. This with his symptoms and his mediastinal adenopathy is concerning for sarcoidosis vs lymphoma but most likely sarcoid. Initially tuberculosis was considered but found to be less likely given no risk factors and pattern if ILD. Currently stable with no shortness of breath while at rest. EBUS scheduled with pulmonology and close PCP follow up is being arranged.     Vital Signs (Most Recent):  Temp: 97.5 °F (36.4 °C) (09/28/20 0750)  Pulse: 83 (09/28/20 0750)  Resp: 16 (09/28/20 0750)  BP: 122/70 (09/28/20 0750)  SpO2: (!) 92 % (09/28/20 0750) Vital Signs (24h Range):  Temp:  [96.2 °F (35.7 °C)-97.8 °F (36.6 °C)] 97.5 °F (36.4 °C)  Pulse:  [83-92] 83  Resp:  [16-18] 16  SpO2:  [90 %-92 %] 92 %  BP: ()/(60-78) 122/70     Weight: 78.8 kg (173 lb 11.6 oz)  Body mass index is 28.04 kg/m².     Physical Exam  Constitutional:       General: He is not in acute distress.     Appearance: Normal appearance.   HENT:      Head: Atraumatic.      Nose: Nose normal. No congestion.      Mouth/Throat:      Mouth: Mucous membranes are moist.      Pharynx: No oropharyngeal exudate or posterior oropharyngeal erythema.   Eyes:      General: No scleral icterus.     Conjunctiva/sclera: Conjunctivae normal.   Neck:      Musculoskeletal: Normal range of motion. No muscular tenderness.   Cardiovascular:      Rate and Rhythm: Regular rhythm.   Pulmonary:      Effort: Pulmonary effort is normal. No respiratory distress.      Breath sounds: Rhonchi present. No rales.      Comments: Diffuse crackles/rhonchi throughout lung fields  Abdominal:      General: Abdomen is flat.      Palpations: Abdomen is soft.   Musculoskeletal: Normal range of motion.         General: No swelling or deformity.   Lymphadenopathy:      Cervical: No cervical adenopathy.   Skin:     General: Skin is warm and dry.      Capillary Refill: Capillary refill takes  less than 2 seconds.      Findings: No rash.   Neurological:      General: No focal deficit present.      Mental Status: He is alert and oriented to person, place, and time.   Psychiatric:         Mood and Affect: Mood normal.         Behavior: Behavior normal.

## 2020-09-28 NOTE — ASSESSMENT & PLAN NOTE
"Patient with "extensive diffuse bilateral micronodular opacities with somewhat more confluent bibasilar consolidation and ground-glass attenuation." Accompanied by bilateral hilar lymphadenopathy on CXR, in the setting of recent dyspnea on exertion, dry cough, night sweats, and 40lb weightloss in 1.5 months, there is concern for systemic disease such as miliary TB or sarcoidosis. He does not appear to have TB risk factors, and no pertinent family history.   Given his lack of fever or leukocytosis, stable vital signs, low concern for pneumonia at this time, therefore deferred beginning antibiotics until further workup completed.     Covid negative  JUAN negative  HIV negative  Urine Ca wnl    - F/u quantiferon gold  - F/u ACE  - Consider dedicated noncontrast high-resolution CT chest to further evaluate  - Airborne isolation until TB/COVID ruled out  - Consulted pulmonology, appreciate recs    "

## 2020-09-28 NOTE — SUBJECTIVE & OBJECTIVE
Interval History: Mr. Gutierrez has done well overnight. He is not producing any sputum and his cough is still intermittent and dry. No fever, chills, nausea, or vomiting.    Objective:     Vital Signs (Most Recent):  Temp: 97.5 °F (36.4 °C) (09/28/20 0750)  Pulse: 83 (09/28/20 0750)  Resp: 16 (09/28/20 0750)  BP: 122/70 (09/28/20 0750)  SpO2: (!) 92 % (09/28/20 0750) Vital Signs (24h Range):  Temp:  [96.2 °F (35.7 °C)-98.4 °F (36.9 °C)] 97.5 °F (36.4 °C)  Pulse:  [67-92] 83  Resp:  [16-18] 16  SpO2:  [90 %-94 %] 92 %  BP: ()/(60-78) 122/70     Weight: 78.8 kg (173 lb 11.6 oz)  Body mass index is 28.04 kg/m².      Intake/Output Summary (Last 24 hours) at 9/28/2020 1149  Last data filed at 9/28/2020 0900  Gross per 24 hour   Intake 360 ml   Output 275 ml   Net 85 ml       Physical Exam  Vitals signs and nursing note reviewed.   Constitutional:       Appearance: Normal appearance.   HENT:      Head: Normocephalic.      Nose: Nose normal.      Mouth/Throat:      Mouth: Mucous membranes are moist.      Pharynx: Oropharynx is clear.   Eyes:      Conjunctiva/sclera: Conjunctivae normal.      Pupils: Pupils are equal, round, and reactive to light.   Neck:      Musculoskeletal: Normal range of motion.   Cardiovascular:      Rate and Rhythm: Normal rate and regular rhythm.      Pulses: Normal pulses.   Pulmonary:      Effort: Pulmonary effort is normal.      Breath sounds: Normal breath sounds.   Abdominal:      General: Abdomen is flat.      Palpations: Abdomen is soft.   Musculoskeletal: Normal range of motion.         General: No swelling, tenderness or deformity.      Right lower leg: No edema.      Left lower leg: No edema.   Lymphadenopathy:      Cervical: No cervical adenopathy.   Skin:     General: Skin is warm and dry.      Capillary Refill: Capillary refill takes less than 2 seconds.      Findings: No lesion or rash.   Neurological:      General: No focal deficit present.      Mental Status: He is alert and  oriented to person, place, and time.   Psychiatric:         Mood and Affect: Mood normal.         Vents:       Lines/Drains/Airways     Peripheral Intravenous Line                 Peripheral IV - Single Lumen 09/27/20 0049 20 G Right Hand 1 day         Peripheral IV - Single Lumen 09/27/20 0853 20 G;1 in Left Hand 1 day                Significant Labs:    CBC/Anemia Profile:  Recent Labs   Lab 09/27/20 0048 09/28/20  0650   WBC 6.30 5.01   HGB 14.1 14.0   HCT 44.0 43.7   * 375*   MCV 84 82   RDW 13.0 12.9        Chemistries:  Recent Labs   Lab 09/27/20 0048 09/28/20  0650    136   K 4.4 4.2    103   CO2 24 27   BUN 14 13   CREATININE 0.9 0.9   CALCIUM 9.0 8.9   ALBUMIN 3.4* 3.0*   PROT 7.6 6.6   BILITOT 0.3 0.6   ALKPHOS 115 111   ALT 53* 57*   AST 47* 42*       All pertinent labs within the past 24 hours have been reviewed.    Significant Imaging:  I have reviewed and interpreted all pertinent imaging results/findings within the past 24 hours.

## 2020-09-28 NOTE — PLAN OF CARE
CM initiated assessment at bedside, pt unable to participate due to medical condition. Emergency contact called for phone interview with spouse Kashmir. Spouse states he is independent and usually requires no assistance. At present pt does not have PCP, CM offered assistance connecting with a PCP close to home at [facility] closer to d/c. Anticipated d/c plan is home with follow up. CM will cont to follow.   Admit DX: SOB (shortness of breath) [R06.02]  Dyspnea on exertion [R06.09]  Chest pain [R07.9]    Patient Care Team:  Primary Doctor No as PCP - General    Payor: [employer] / Plan: [facility] CHOICE PLUS / Product Type: Commercial /       [id number] - [place], [place] - [address]  [address]  [facility] [address]  Phone: [phone] Fax: [phone]     09/28/20 1136   Discharge Assessment   Assessment Type Discharge Planning Assessment   Confirmed/corrected address and phone number on facesheet? Yes   Assessment information obtained from? Caregiver   Expected Length of Stay (days) 4   Communicated expected length of stay with patient/caregiver yes   Prior to hospitilization cognitive status: Alert/Oriented   Prior to hospitalization functional status: Independent   Current cognitive status: Alert/Oriented   Current Functional Status: Independent   Facility Arrived From: Home   Lives With spouse   Able to Return to Prior Arrangements yes   Is patient able to care for self after discharge? Yes   Who are your caregiver(s) and their phone number(s)? Kashmir Elder (spouse) [phone]   Patient's perception of discharge disposition home or selfcare   Readmission Within the Last 30 Days no previous admission in last 30 days   Patient currently being followed by outpatient case management? No   Patient currently receives any other outside agency services? No   Equipment Currently Used at Home none   Do you have any  problems affording any of your prescribed medications? No   Is the patient taking medications as prescribed? yes   Does the patient have transportation home? Yes   Transportation Anticipated family or friend will provide   Does the patient receive services at the [facility]? No   Discharge Plan A Home   Discharge Plan B Home;Home with family   DME Needed Upon Discharge  none   Patient/Family in Agreement with Plan yes   Kiana Martinez, MSN  Case Management  Ext [phone]

## 2020-09-28 NOTE — DISCHARGE SUMMARY
"[facility] Hospital Medicine  Discharge Summary      Patient Name: Quinton Gutierrez Jr.  MRN: [id number]  Admission Date: 9/27/2020  Hospital Length of Stay: 0 days  Discharge Date and Time:  09/28/2020 3:29 PM  Attending Physician: Yanely Canales MD   Discharging Provider: Jacinto Christopher MD  Primary Care Provider: Primary Doctor [facility] Medicine Team: [facility] MED 5 Jacinto Christopher MD    HPI:   Mr Gutierrez is a 46 yo M with no significant past medical history who presents with a chief complaint of chest tightness and dyspnea on exertion. 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 These symptoms were preceded by the onset of night sweats approximately 1 month ago, which he quantifies as waking up with his shirt "pretty soaked." He also reports unintentionally losing around 40 lbs in the last month. He used to weigh 195 lbs, and now he weighs 150s despite having a good appetite and eating his normal amount (snacking throughout the day with 1 main meal at night). He also reports mild headache, diffuse body aches, and polyuria. He denies any hemoptysis, hematemesis, melena, diarrhea, unusual rashes, joint swelling, vision changes, dizziness, gait or balance issues.     He presented to an urgent care on 9/14 and was given prescription for azithromycin and steroids, which did not relieve his symptoms. COVID test was negative at that time. A visit with a telehealth nurse just prior to admission advised him to go to the ED for evaluation. He reports he has always been very healthy, denies incarceration history, denies pertinent family history in parents, siblings, or his two children. He does not use tobacco, abuse alcohol now or in the past, or use street drugs.     Procedure(s) (LRB):  ENDOBRONCHIAL ULTRASOUND (EBUS) (N/A)      Hospital Course:   Bilateral " micronodular disease with some very scant ground glass was seen on imaging. Pulmonology consulted. This with his symptoms and his mediastinal adenopathy is concerning for sarcoidosis vs lymphoma but most likely sarcoid. Initially tuberculosis was considered but found to be less likely given no risk factors and pattern if ILD. Currently stable with no shortness of breath while at rest. EBUS scheduled with pulmonology and close PCP follow up is being arranged.     Vital Signs (Most Recent):  Temp: 97.5 °F (36.4 °C) (09/28/20 0750)  Pulse: 83 (09/28/20 0750)  Resp: 16 (09/28/20 0750)  BP: 122/70 (09/28/20 0750)  SpO2: (!) 92 % (09/28/20 0750) Vital Signs (24h Range):  Temp:  [96.2 °F (35.7 °C)-97.8 °F (36.6 °C)] 97.5 °F (36.4 °C)  Pulse:  [83-92] 83  Resp:  [16-18] 16  SpO2:  [90 %-92 %] 92 %  BP: ()/(60-78) 122/70     Weight: 78.8 kg (173 lb 11.6 oz)  Body mass index is 28.04 kg/m².     Physical Exam  Constitutional:       General: He is not in acute distress.     Appearance: Normal appearance.   HENT:      Head: Atraumatic.      Nose: Nose normal. No congestion.      Mouth/Throat:      Mouth: Mucous membranes are moist.      Pharynx: No oropharyngeal exudate or posterior oropharyngeal erythema.   Eyes:      General: No scleral icterus.     Conjunctiva/sclera: Conjunctivae normal.   Neck:      Musculoskeletal: Normal range of motion. No muscular tenderness.   Cardiovascular:      Rate and Rhythm: Regular rhythm.   Pulmonary:      Effort: Pulmonary effort is normal. No respiratory distress.      Breath sounds: Rhonchi present. No rales.      Comments: Diffuse crackles/rhonchi throughout lung fields  Abdominal:      General: Abdomen is flat.      Palpations: Abdomen is soft.   Musculoskeletal: Normal range of motion.         General: No swelling or deformity.   Lymphadenopathy:      Cervical: No cervical adenopathy.   Skin:     General: Skin is warm and dry.      Capillary Refill: Capillary refill takes less than  "2 seconds.      Findings: No rash.   Neurological:      General: No focal deficit present.      Mental Status: He is alert and oriented to person, place, and time.   Psychiatric:         Mood and Affect: Mood normal.         Behavior: Behavior normal.        Consults:   Consults (From admission, onward)        Status Ordering Provider     Inpatient consult to Pulmonology  Once     Provider:  (Not yet assigned)    Completed PENG AMBROSIO H          * Bilateral Lung infiltrate on CT  Patient with "extensive diffuse bilateral micronodular opacities with somewhat more confluent bibasilar consolidation and ground-glass attenuation." Accompanied by bilateral hilar lymphadenopathy on CXR, in the setting of recent dyspnea on exertion, dry cough, night sweats, and 40lb weightloss in 1.5 months, there is concern for systemic disease such as miliary TB or sarcoidosis. He does not appear to have TB risk factors, and no pertinent family history.   Given his lack of fever or leukocytosis, stable vital signs, low concern for pneumonia at this time, therefore deferred beginning antibiotics until further workup completed.     Covid negative  JUAN negative  HIV negative  Urine Ca wnl    - F/u quantiferon gold  - F/u ACE  - Consider dedicated noncontrast high-resolution CT chest to further evaluate  - Airborne isolation until TB/COVID ruled out  - Consulted pulmonology, appreciate recs      Discharge planning issues  Patient scheduled for EBUS with pulmonology. Will have PCP set up and follow up appointment      SOB (shortness of breath)  Patient's presenting symptom was dyspnea on exertion. Maintaining appropriate O2 saturation on room air currently.   D dimer was elevated in ED to 0.7, CTA obtained and shows no evidence of PE.  BNP and Troponin wnl.     - Continuous pulse oximetry  - Will supplement O2 via nasal cannula if needed  - has been saturating low 90's on room air. 6 minute walk test with no need for home " oxygen        Final Active Diagnoses:    Diagnosis Date Noted POA    PRINCIPAL PROBLEM:  Bilateral Lung infiltrate on CT [R91.8] 09/27/2020 Unknown    Mediastinal lymphadenopathy [R59.0] 09/28/2020 Unknown    SOB (shortness of breath) [R06.02] 09/27/2020 Yes    DVT prophylaxis [Z29.9] 09/27/2020 Not Applicable    Discharge planning issues [Z02.9] 09/27/2020 Not Applicable      Problems Resolved During this Admission:       Discharged Condition: good    Disposition:     Follow Up: With PCP and Pulmonology for EBUS    Patient Instructions:   No discharge procedures on file.    Significant Diagnostic Studies: Labs:   CMP   Recent Labs   Lab 09/27/20  0048 09/28/20  0650    136   K 4.4 4.2    103   CO2 24 27    98   BUN 14 13   CREATININE 0.9 0.9   CALCIUM 9.0 8.9   PROT 7.6 6.6   ALBUMIN 3.4* 3.0*   BILITOT 0.3 0.6   ALKPHOS 115 111   AST 47* 42*   ALT 53* 57*   ANIONGAP 12 6*   ESTGFRAFRICA >60.0 >60.0   EGFRNONAA >60.0 >60.0    and CBC   Recent Labs   Lab 09/27/20  0048 09/28/20  0650   WBC 6.30 5.01   HGB 14.1 14.0   HCT 44.0 43.7   * 375*       Pending Diagnostic Studies:     Procedure Component Value Units Date/Time    Angiotensin converting enzyme [[id number]] Collected: 09/27/20 0632    Order Status: Sent Lab Status: In process Updated: 09/27/20 0641    Specimen: Blood     HIV 1/2 Ag/Ab (4th Gen) [[id number]] Collected: 09/27/20 0632    Order Status: Sent Lab Status: In process Updated: 09/27/20 0641    Specimen: Blood     Quantiferon Gold TB [[id number]] Collected: 09/28/20 1220    Order Status: Sent Lab Status: In process Updated: 09/28/20 1231    Specimen: Blood          Medications:  Reconciled Home Medications:      Medication List      You have not been prescribed any medications.         Indwelling Lines/Drains at time of discharge:   Lines/Drains/Airways     None                 Time spent on the discharge of patient: 35 minutes  Patient was seen and examined on the date of  discharge and determined to be suitable for discharge.         Jacinto Christopher MD  Department of Hospital Medicine  [facility]

## 2020-09-28 NOTE — PLAN OF CARE
09/28/20 0923   Post-Acute Status   Post-Acute Authorization Other   Other Status No Post-Acute Service Needs

## 2020-09-28 NOTE — NURSING
Home Oxygen Evaluation    Date Performed: 2020    1) Patient's Home O2 Sat on room air, while at rest: 90%-93%        If O2 sats on room air at rest are 88% or below, patient qualifies. No additional testing needed. Document N/A in steps 2 and 3. If 89% or above, complete steps 2.      2) Patient's O2 Sat on room air while exercisin%-93%        If O2 sats on room air while exercising remain 89% or above patient does not qualify, no further testing needed Document N/A in step 3. If O2 sats on room air while exercising are 88% or below, continue to step 3.      3) Patient's O2 Sat while exercising on O2: N/A         (Must show improvement from #2 for patients to qualify)    If O2 sats improve on oxygen, patient qualifies for portable oxygen. If not, the patient does not qualify.

## 2020-09-28 NOTE — SUBJECTIVE & OBJECTIVE
Interval History: ***    Review of Systems  Objective:     Vital Signs (Most Recent):  Temp: 97.5 °F (36.4 °C) (09/28/20 0750)  Pulse: 83 (09/28/20 0750)  Resp: 16 (09/28/20 0750)  BP: 122/70 (09/28/20 0750)  SpO2: (!) 92 % (09/28/20 0750) Vital Signs (24h Range):  Temp:  [96.2 °F (35.7 °C)-97.8 °F (36.6 °C)] 97.5 °F (36.4 °C)  Pulse:  [83-92] 83  Resp:  [16-18] 16  SpO2:  [90 %-92 %] 92 %  BP: ()/(60-78) 122/70     Weight: 78.8 kg (173 lb 11.6 oz)  Body mass index is 28.04 kg/m².    Intake/Output Summary (Last 24 hours) at 9/28/2020 1522  Last data filed at 9/28/2020 1200  Gross per 24 hour   Intake 600 ml   Output --   Net 600 ml      Physical Exam  Constitutional:       General: He is not in acute distress.     Appearance: Normal appearance.   HENT:      Head: Atraumatic.      Nose: Nose normal. No congestion.      Mouth/Throat:      Mouth: Mucous membranes are moist.      Pharynx: No oropharyngeal exudate or posterior oropharyngeal erythema.   Eyes:      General: No scleral icterus.     Conjunctiva/sclera: Conjunctivae normal.   Neck:      Musculoskeletal: Normal range of motion. No muscular tenderness.   Cardiovascular:      Rate and Rhythm: Regular rhythm.   Pulmonary:      Effort: Pulmonary effort is normal. No respiratory distress.      Breath sounds: Rhonchi present. No rales.      Comments: Diffuse crackles/rhonchi throughout lung fields  Abdominal:      General: Abdomen is flat.      Palpations: Abdomen is soft.   Musculoskeletal: Normal range of motion.         General: No swelling or deformity.   Lymphadenopathy:      Cervical: No cervical adenopathy.   Skin:     General: Skin is warm and dry.      Capillary Refill: Capillary refill takes less than 2 seconds.      Findings: No rash.   Neurological:      General: No focal deficit present.      Mental Status: He is alert and oriented to person, place, and time.   Psychiatric:         Mood and Affect: Mood normal.         Behavior: Behavior normal.          Significant Labs: {Results:47029}    Significant Imaging: {Imaging Review:31818}

## 2020-09-28 NOTE — ASSESSMENT & PLAN NOTE
Bilateral micronodular disease with some very scant ground glass. This with his symptoms and his mediastinal adenopathy is concerning for sarcoidosis vs lymphoma but most likely sarcoid.   - very low suspicion for TB given his lack of risk factors and the type of lung disease present.   - will plan to get him scheduled for EBUS as soon as we can with Dr. De Los Santos (she is reviewing her schedule today) Plan for either tomorrow or he can come back as an outpatient.   - no need for any specific treatments currently.

## 2020-09-28 NOTE — ASSESSMENT & PLAN NOTE
Concerning for sarcoid vs lympohoma  - less concern for infectious cause.   - EBUS as above when able to schedule.

## 2020-09-28 NOTE — H&P (VIEW-ONLY)
[facility]  Pulmonology  Progress Note    Patient Name: Quinton Gutierrez Jr.  MRN: [id number]  Admission Date: 9/27/2020  Hospital Length of Stay: 0 days  Code Status: Full Code  Attending Provider: Yanely Canales MD  Primary Care Provider: Primary Doctor No   Principal Problem: Lung infiltrate on CT    Subjective:     Interval History: Mr. Gutierrez has done well overnight. He is not producing any sputum and his cough is still intermittent and dry. No fever, chills, nausea, or vomiting.    Objective:     Vital Signs (Most Recent):  Temp: 97.5 °F (36.4 °C) (09/28/20 0750)  Pulse: 83 (09/28/20 0750)  Resp: 16 (09/28/20 0750)  BP: 122/70 (09/28/20 0750)  SpO2: (!) 92 % (09/28/20 0750) Vital Signs (24h Range):  Temp:  [96.2 °F (35.7 °C)-98.4 °F (36.9 °C)] 97.5 °F (36.4 °C)  Pulse:  [67-92] 83  Resp:  [16-18] 16  SpO2:  [90 %-94 %] 92 %  BP: ()/(60-78) 122/70     Weight: 78.8 kg (173 lb 11.6 oz)  Body mass index is 28.04 kg/m².      Intake/Output Summary (Last 24 hours) at 9/28/2020 1149  Last data filed at 9/28/2020 0900  Gross per 24 hour   Intake 360 ml   Output 275 ml   Net 85 ml       Physical Exam  Vitals signs and nursing note reviewed.   Constitutional:       Appearance: Normal appearance.   HENT:      Head: Normocephalic.      Nose: Nose normal.      Mouth/Throat:      Mouth: Mucous membranes are moist.      Pharynx: Oropharynx is clear.   Eyes:      Conjunctiva/sclera: Conjunctivae normal.      Pupils: Pupils are equal, round, and reactive to light.   Neck:      Musculoskeletal: Normal range of motion.   Cardiovascular:      Rate and Rhythm: Normal rate and regular rhythm.      Pulses: Normal pulses.   Pulmonary:      Effort: Pulmonary effort is normal.      Breath sounds: Normal breath sounds.   Abdominal:      General: Abdomen is flat.      Palpations: Abdomen is soft.   Musculoskeletal: Normal range of motion.         General: No swelling, tenderness or deformity.      Right lower leg: No  edema.      Left lower leg: No edema.   Lymphadenopathy:      Cervical: No cervical adenopathy.   Skin:     General: Skin is warm and dry.      Capillary Refill: Capillary refill takes less than 2 seconds.      Findings: No lesion or rash.   Neurological:      General: No focal deficit present.      Mental Status: He is alert and oriented to person, place, and time.   Psychiatric:         Mood and Affect: Mood normal.         Vents:       Lines/Drains/Airways     Peripheral Intravenous Line                 Peripheral IV - Single Lumen 09/27/20 0049 20 G Right Hand 1 day         Peripheral IV - Single Lumen 09/27/20 0853 20 G;1 in Left Hand 1 day                Significant Labs:    CBC/Anemia Profile:  Recent Labs   Lab 09/27/20 0048 09/28/20  0650   WBC 6.30 5.01   HGB 14.1 14.0   HCT 44.0 43.7   * 375*   MCV 84 82   RDW 13.0 12.9        Chemistries:  Recent Labs   Lab 09/27/20 0048 09/28/20  0650    136   K 4.4 4.2    103   CO2 24 27   BUN 14 13   CREATININE 0.9 0.9   CALCIUM 9.0 8.9   ALBUMIN 3.4* 3.0*   PROT 7.6 6.6   BILITOT 0.3 0.6   ALKPHOS 115 111   ALT 53* 57*   AST 47* 42*       All pertinent labs within the past 24 hours have been reviewed.    Significant Imaging:  I have reviewed and interpreted all pertinent imaging results/findings within the past 24 hours.    Assessment/Plan:     * Bilateral Lung infiltrate on CT  Bilateral micronodular disease with some very scant ground glass. This with his symptoms and his mediastinal adenopathy is concerning for sarcoidosis vs lymphoma but most likely sarcoid.   - very low suspicion for TB given his lack of risk factors and the type of lung disease present.   - will plan to get him scheduled for EBUS as soon as we can with Dr. De Los Santos (she is reviewing her schedule today) Plan for either tomorrow or he can come back as an outpatient.   - no need for any specific treatments currently.     Mediastinal lymphadenopathy  Concerning for sarcoid vs  lympohoma  - less concern for infectious cause.   - EBUS as above when able to schedule.     SOB (shortness of breath)  He denies any current shortness of breath. Continue to monitor for now.            Courtney Arrington MD  Pulmonology  [facility]

## 2020-09-28 NOTE — ASSESSMENT & PLAN NOTE
Patient's presenting symptom was dyspnea on exertion. Maintaining appropriate O2 saturation on room air currently.   D dimer was elevated in ED to 0.7, CTA obtained and shows no evidence of PE.  BNP and Troponin wnl.     - Continuous pulse oximetry  - Will supplement O2 via nasal cannula if needed  - has been saturating low 90's on room air. 6 minute walk test with no need for home oxygen

## 2020-09-29 LAB
ACE SERPL-CCNC: NORMAL U/L
GAMMA INTERFERON BACKGROUND BLD IA-ACNC: 0.09 IU/ML
M TB IFN-G CD4+ BCKGRND COR BLD-ACNC: 0 IU/ML
MITOGEN IGNF BCKGRD COR BLD-ACNC: 6.49 IU/ML
TB GOLD PLUS: NEGATIVE
TB2 - NIL: -0.01 IU/ML

## 2020-09-30 ENCOUNTER — ANESTHESIA EVENT (OUTPATIENT)
Dept: SURGERY | Facility: HOSPITAL | Age: 45
End: 2020-09-30
Payer: COMMERCIAL

## 2020-10-01 ENCOUNTER — HOSPITAL ENCOUNTER (OUTPATIENT)
Facility: HOSPITAL | Age: 45
Discharge: HOME | End: 2020-10-01
Attending: INTERNAL MEDICINE | Admitting: INTERNAL MEDICINE
Payer: COMMERCIAL

## 2020-10-01 ENCOUNTER — ANESTHESIA (OUTPATIENT)
Dept: SURGERY | Facility: HOSPITAL | Age: 45
End: 2020-10-01
Payer: COMMERCIAL

## 2020-10-01 VITALS
BODY MASS INDEX: 24.69 KG/M2 | WEIGHT: 153 LBS | RESPIRATION RATE: 20 BRPM | DIASTOLIC BLOOD PRESSURE: 67 MMHG | TEMPERATURE: 98 F | OXYGEN SATURATION: 93 % | HEART RATE: 80 BPM | SYSTOLIC BLOOD PRESSURE: 102 MMHG

## 2020-10-01 DIAGNOSIS — R59.0 MEDIASTINAL LYMPHADENOPATHY: Primary | ICD-10-CM

## 2020-10-01 LAB
GRAM STN SPEC: NORMAL
GRAM STN SPEC: NORMAL
SARS-COV-2 RDRP RESP QL NAA+PROBE: NEGATIVE

## 2020-10-01 PROCEDURE — 31624 DX BRONCHOSCOPE/LAVAGE: CPT | Mod: 59,RT,, | Performed by: INTERNAL MEDICINE

## 2020-10-01 PROCEDURE — 31625 PR BRONCHOSCOPY,BIOPSY: ICD-10-PCS | Mod: 59,LT,, | Performed by: INTERNAL MEDICINE

## 2020-10-01 PROCEDURE — 88173 CYTOPATH EVAL FNA REPORT: CPT | Mod: 59 | Performed by: PATHOLOGY

## 2020-10-01 PROCEDURE — D9220A PRA ANESTHESIA: ICD-10-PCS | Mod: ANES,,, | Performed by: ANESTHESIOLOGY

## 2020-10-01 PROCEDURE — 27201423 OPTIME MED/SURG SUP & DEVICES STERILE SUPPLY: Performed by: INTERNAL MEDICINE

## 2020-10-01 PROCEDURE — D9220A PRA ANESTHESIA: ICD-10-PCS | Mod: CRNA,,, | Performed by: NURSE ANESTHETIST, CERTIFIED REGISTERED

## 2020-10-01 PROCEDURE — 88189 FLOWCYTOMETRY/READ 16 & >: CPT | Mod: ,,, | Performed by: PATHOLOGY

## 2020-10-01 PROCEDURE — 88305 TISSUE EXAM BY PATHOLOGIST: CPT | Mod: 59 | Performed by: PATHOLOGY

## 2020-10-01 PROCEDURE — 63600175 PHARM REV CODE 636 W HCPCS: Performed by: NURSE ANESTHETIST, CERTIFIED REGISTERED

## 2020-10-01 PROCEDURE — 87102 FUNGUS ISOLATION CULTURE: CPT

## 2020-10-01 PROCEDURE — 87075 CULTR BACTERIA EXCEPT BLOOD: CPT

## 2020-10-01 PROCEDURE — U0002 COVID-19 LAB TEST NON-CDC: HCPCS

## 2020-10-01 PROCEDURE — 37000008 HC ANESTHESIA 1ST 15 MINUTES: Performed by: INTERNAL MEDICINE

## 2020-10-01 PROCEDURE — 25000003 PHARM REV CODE 250: Performed by: NURSE ANESTHETIST, CERTIFIED REGISTERED

## 2020-10-01 PROCEDURE — 88312 SPECIAL STAINS GROUP 1: CPT | Performed by: PATHOLOGY

## 2020-10-01 PROCEDURE — 87205 SMEAR GRAM STAIN: CPT

## 2020-10-01 PROCEDURE — 71000015 HC POSTOP RECOV 1ST HR: Performed by: INTERNAL MEDICINE

## 2020-10-01 PROCEDURE — 88305 TISSUE EXAM BY PATHOLOGIST: CPT | Performed by: PATHOLOGY

## 2020-10-01 PROCEDURE — 36000705 HC OR TIME LEV I EA ADD 15 MIN: Performed by: INTERNAL MEDICINE

## 2020-10-01 PROCEDURE — C1726 CATH, BAL DIL, NON-VASCULAR: HCPCS | Performed by: INTERNAL MEDICINE

## 2020-10-01 PROCEDURE — 88184 FLOWCYTOMETRY/ TC 1 MARKER: CPT | Performed by: PATHOLOGY

## 2020-10-01 PROCEDURE — 88185 FLOWCYTOMETRY/TC ADD-ON: CPT | Performed by: PATHOLOGY

## 2020-10-01 PROCEDURE — 87206 SMEAR FLUORESCENT/ACID STAI: CPT

## 2020-10-01 PROCEDURE — 87070 CULTURE OTHR SPECIMN AEROBIC: CPT

## 2020-10-01 PROCEDURE — 88189 PR  FLOWCYTOMETRY/READ, 16 & > MARKERS: ICD-10-PCS | Mod: ,,, | Performed by: PATHOLOGY

## 2020-10-01 PROCEDURE — 25000003 PHARM REV CODE 250: Performed by: INTERNAL MEDICINE

## 2020-10-01 PROCEDURE — D9220A PRA ANESTHESIA: Mod: ANES,,, | Performed by: ANESTHESIOLOGY

## 2020-10-01 PROCEDURE — 87116 MYCOBACTERIA CULTURE: CPT

## 2020-10-01 PROCEDURE — 88305 TISSUE EXAM BY PATHOLOGIST: CPT | Mod: 26,59,, | Performed by: PATHOLOGY

## 2020-10-01 PROCEDURE — 31625 BRONCHOSCOPY W/BIOPSY(S): CPT | Mod: 59,LT,, | Performed by: INTERNAL MEDICINE

## 2020-10-01 PROCEDURE — 31624 PR BRONCHOSCOPY,DIAG2STIC W LAVAGE: ICD-10-PCS | Mod: 59,RT,, | Performed by: INTERNAL MEDICINE

## 2020-10-01 PROCEDURE — 71000044 HC DOSC ROUTINE RECOVERY FIRST HOUR: Performed by: INTERNAL MEDICINE

## 2020-10-01 PROCEDURE — 36000704 HC OR TIME LEV I 1ST 15 MIN: Performed by: INTERNAL MEDICINE

## 2020-10-01 PROCEDURE — 31652 BRONCH EBUS SAMPLNG 1/2 NODE: CPT | Mod: ,,, | Performed by: INTERNAL MEDICINE

## 2020-10-01 PROCEDURE — 37000009 HC ANESTHESIA EA ADD 15 MINS: Performed by: INTERNAL MEDICINE

## 2020-10-01 PROCEDURE — 87015 SPECIMEN INFECT AGNT CONCNTJ: CPT

## 2020-10-01 PROCEDURE — 88177 CYTP FNA EVAL EA ADDL: CPT | Mod: 59 | Performed by: PATHOLOGY

## 2020-10-01 PROCEDURE — 88305 TISSUE EXAM BY PATHOLOGIST: ICD-10-PCS | Mod: 26,59,, | Performed by: PATHOLOGY

## 2020-10-01 PROCEDURE — D9220A PRA ANESTHESIA: Mod: CRNA,,, | Performed by: NURSE ANESTHETIST, CERTIFIED REGISTERED

## 2020-10-01 PROCEDURE — 71000016 HC POSTOP RECOV ADDL HR: Performed by: INTERNAL MEDICINE

## 2020-10-01 PROCEDURE — 88172 CYTP DX EVAL FNA 1ST EA SITE: CPT | Performed by: PATHOLOGY

## 2020-10-01 PROCEDURE — 31652 PR BRONCH W/ EBUS, SAMPLING 1 OR 2 NODES, INCL GUIDE: ICD-10-PCS | Mod: ,,, | Performed by: INTERNAL MEDICINE

## 2020-10-01 PROCEDURE — 27200651 HC AIRWAY, LMA: Performed by: ANESTHESIOLOGY

## 2020-10-01 RX ORDER — PROPOFOL 10 MG/ML
VIAL (ML) INTRAVENOUS CONTINUOUS PRN
Status: DISCONTINUED | OUTPATIENT
Start: 2020-10-01 | End: 2020-10-01

## 2020-10-01 RX ORDER — FENTANYL CITRATE 50 UG/ML
INJECTION, SOLUTION INTRAMUSCULAR; INTRAVENOUS
Status: DISCONTINUED | OUTPATIENT
Start: 2020-10-01 | End: 2020-10-01

## 2020-10-01 RX ORDER — PHENYLEPHRINE HYDROCHLORIDE 10 MG/ML
INJECTION INTRAVENOUS
Status: DISCONTINUED | OUTPATIENT
Start: 2020-10-01 | End: 2020-10-01

## 2020-10-01 RX ORDER — SODIUM CHLORIDE 9 MG/ML
INJECTION, SOLUTION INTRAVENOUS CONTINUOUS PRN
Status: DISCONTINUED | OUTPATIENT
Start: 2020-10-01 | End: 2020-10-01

## 2020-10-01 RX ORDER — KETAMINE HCL IN 0.9 % NACL 50 MG/5 ML
SYRINGE (ML) INTRAVENOUS
Status: DISCONTINUED | OUTPATIENT
Start: 2020-10-01 | End: 2020-10-01

## 2020-10-01 RX ORDER — LIDOCAINE HCL/PF 100 MG/5ML
SYRINGE (ML) INTRAVENOUS
Status: DISCONTINUED | OUTPATIENT
Start: 2020-10-01 | End: 2020-10-01

## 2020-10-01 RX ORDER — PROPOFOL 10 MG/ML
VIAL (ML) INTRAVENOUS
Status: DISCONTINUED | OUTPATIENT
Start: 2020-10-01 | End: 2020-10-01

## 2020-10-01 RX ORDER — MIDAZOLAM HYDROCHLORIDE 1 MG/ML
INJECTION INTRAMUSCULAR; INTRAVENOUS
Status: DISCONTINUED | OUTPATIENT
Start: 2020-10-01 | End: 2020-10-01

## 2020-10-01 RX ORDER — LIDOCAINE HYDROCHLORIDE 10 MG/ML
INJECTION, SOLUTION EPIDURAL; INFILTRATION; INTRACAUDAL; PERINEURAL
Status: DISCONTINUED | OUTPATIENT
Start: 2020-10-01 | End: 2020-10-01 | Stop reason: HOSPADM

## 2020-10-01 RX ADMIN — PROPOFOL 200 MCG/KG/MIN: 10 INJECTION, EMULSION INTRAVENOUS at 09:10

## 2020-10-01 RX ADMIN — MIDAZOLAM HYDROCHLORIDE 2 MG: 1 INJECTION, SOLUTION INTRAMUSCULAR; INTRAVENOUS at 09:10

## 2020-10-01 RX ADMIN — LIDOCAINE HYDROCHLORIDE 100 MG: 20 INJECTION, SOLUTION INTRAVENOUS at 09:10

## 2020-10-01 RX ADMIN — Medication 30 MG: at 09:10

## 2020-10-01 RX ADMIN — PHENYLEPHRINE HYDROCHLORIDE 100 MCG: 10 INJECTION INTRAVENOUS at 09:10

## 2020-10-01 RX ADMIN — PROPOFOL 50 MG: 10 INJECTION, EMULSION INTRAVENOUS at 09:10

## 2020-10-01 RX ADMIN — PHENYLEPHRINE HYDROCHLORIDE 100 MCG: 10 INJECTION INTRAVENOUS at 10:10

## 2020-10-01 RX ADMIN — FENTANYL CITRATE 100 MCG: 50 INJECTION, SOLUTION INTRAMUSCULAR; INTRAVENOUS at 09:10

## 2020-10-01 RX ADMIN — PROPOFOL 200 MG: 10 INJECTION, EMULSION INTRAVENOUS at 09:10

## 2020-10-01 RX ADMIN — SODIUM CHLORIDE: 0.9 INJECTION, SOLUTION INTRAVENOUS at 08:10

## 2020-10-01 NOTE — TRANSFER OF CARE
Anesthesia Transfer of Care Note    Patient: Quinton Gutierrez Jr.    Procedure(s) Performed: Procedure(s) (LRB):  ENDOBRONCHIAL ULTRASOUND (EBUS) (N/A)    Patient location: [facility]    Anesthesia Type: general    Transport from OR: Transported from OR on 6-10 L/min O2 by face mask with adequate spontaneous ventilation    Post pain: adequate analgesia    Post assessment: no apparent anesthetic complications and tolerated procedure well    Post vital signs: stable    Level of consciousness: sedated    Nausea/Vomiting: no nausea/vomiting    Complications: none    Transfer of care protocol was followed      Last vitals:   Visit Vitals  BP (!) 91/58   Pulse 90   Temp 36.7 °C (98 °F) (Oral)   Resp 16   Wt 69.4 kg (153 lb)   SpO2 97%   BMI 24.69 kg/m²

## 2020-10-01 NOTE — DISCHARGE INSTRUCTIONS
Endoscopic Diagnosis of Chest, Lung Problems  Youve been told you need an endoscopic procedure to diagnose a problem in your chest or lung. This procedure allows your healthcare provider to view the airway of your lungs and take a tissue sample (biopsy) or treat a lung condition, if needed.     With bronchoscopy, a flexible scope allows the healthcare provider to view and biopsy the airway.   Bronchoscopy  A bronchoscopy allows the healthcare provider to look directly into the airways in your lungs. This is done using a bronchoscope. A bronchoscope is a thin, flexible, hollow, lighted tube that lets the doctor see inside the lung. Tools can be passed down the middle of the scope.  Transbronchial biopsy  Transbronchial biopsy (TBB) is a procedure used mainly to take samples of tissue near the airway. This is done using a bronchoscope and tiny forceps. The forceps are passed through the scope into the airway, and a sample is taken.  Endobronchial ultrasound  Endobronchial ultrasound (EBUS) is a type of bronchoscopy. With EBUS, the lungs and the space between the lungs (mediastinum) are looked at using a flexible bronchoscope and ultrasound (images created using sound waves). Ultrasound guides the healthcare provider and allows him or her to see through the airway walls.  Preparing for the procedure  Before your procedure, do the following:  · Follow your healthcare providers instructions about eating and drinking.  · Tell your healthcare provider about the medicines you take. You may need to stop taking some of them before the procedure, especially aspirin, Coumadin, or other blood thinners.  · Talk with your healthcare provider about any allergies and health problems you have.  · Tell your healthcare provider if you are pregnant.  During the procedure  You will get medicine through an intravenous (IV) line to help you relax and sleep during the procedure. You may also receive local anesthesia (numbing medicine)  with a needle. Then a special spray is used to numb your throat and nose or mouth. This is to help keep you comfortable and prevent coughing during the procedure.  After the procedure  You are sent to the recovery room until the sedation wears off. This takes about 1 to 2 hours. Once you are fully awake, you can go home. You will need an adult family member or friend to drive you home. Your throat will be sore for a day or two. At first, there may be a small amount of blood in your sputum. This is normal. It should go away after the second day.  Risks and complications  · Bleeding  · Infection  · Injury to vocal cords  · Pneumothorax (collapsed lung)   When to call your healthcare provider  · Large amounts of blood in sputum  · Blood in sputum after 2 days  · Shortness of breath  · Chest pain  · Fever of 100.4ºF (38°C) or higher, or as directed by your healthcare provider  · Hoarseness that wont go away   Date Last Reviewed: 11/1/2016  © 0038-7449 [employer], [employer]. [address] [place] [address], [place] [address]. All rights reserved. This information is not intended as a substitute for professional medical care. Always follow your healthcare professional's instructions.      .

## 2020-10-01 NOTE — ANESTHESIA POSTPROCEDURE EVALUATION
Anesthesia Post Evaluation    Patient: Quinton Gutierrez Jr.    Procedure(s) Performed: Procedure(s) (LRB):  ENDOBRONCHIAL ULTRASOUND (EBUS) (N/A)    Final Anesthesia Type: general    Patient location during evaluation: PACU  Patient participation: Yes- Able to Participate  Level of consciousness: awake and alert  Post-procedure vital signs: reviewed and stable  Pain management: adequate  Airway patency: patent    PONV status at discharge: No PONV  Anesthetic complications: no      Cardiovascular status: blood pressure returned to baseline  Respiratory status: unassisted, spontaneous ventilation and room air  Hydration status: euvolemic  Follow-up not needed.          Vitals Value Taken Time   /67 10/01/20 1217   Temp 36.8 °C (98.2 °F) 10/01/20 1215   Pulse 78 10/01/20 1221   Resp 20 10/01/20 1221   SpO2 92 % 10/01/20 1221   Vitals shown include unvalidated device data.      No case tracking events are documented in the log.      Pain/Antonio Score: Antonio Score: 9 (10/1/2020 12:24 PM)

## 2020-10-01 NOTE — ANESTHESIA PROCEDURE NOTES
Intubation  Performed by: Bettie Pérez CRNA  Authorized by: Glendy Vergara MD     Intubation:     Induction:  Intravenous    Intubated:  Postinduction    Mask Ventilation:  Easy mask    Attempts:  1    Attempted By:  Student    Difficult Airway Encountered?: No      Complications:  None    Airway Device:  Supraglottic airway/LMA    Airway Device Size:  5.0    Secured at:  The lips    Placement Verified By:  Capnometry    Complicating Factors:  None

## 2020-10-01 NOTE — ANESTHESIA PREPROCEDURE EVALUATION
10/01/2020  Quinton Gutierrez Jr. is a 45 y.o., male.    Anesthesia Evaluation    I have reviewed the Patient Summary Reports.    I have reviewed the Nursing Notes.    I have reviewed the Medications.     Review of Systems  Anesthesia Hx:  No problems with previous Anesthesia  Neg history of prior surgery. Denies Family Hx of Anesthesia complications.   Denies Personal Hx of Anesthesia complications.   Social:  Non-Smoker    Hematology/Oncology:  Hematology Normal   Oncology Normal     EENT/Dental:EENT/Dental Normal   Cardiovascular:  Cardiovascular Normal     Pulmonary:   Shortness of breath    Renal/:  Renal/ Normal     Hepatic/GI:  Hepatic/GI Normal    Musculoskeletal:  Musculoskeletal Normal    Neurological:  Neurology Normal    Endocrine:  Endocrine Normal    Psych:  Psychiatric Normal           Physical Exam  General:  Well nourished    Airway/Jaw/Neck:  Airway Findings: Mouth Opening: Normal Tongue: Normal  General Airway Assessment: Adult  Mallampati: I  TM Distance: Normal, at least 6 cm  Jaw/Neck Findings:  Neck ROM: Normal ROM      Dental:  Dental Findings: In tact   Chest/Lungs:  Chest/Lungs Findings: Clear to auscultation, Normal Respiratory Rate     Heart/Vascular:  Heart Findings: Rate: Normal  Rhythm: Regular Rhythm  Sounds: Normal        Mental Status:  Mental Status Findings:  Cooperative, Alert and Oriented         Anesthesia Plan  Type of Anesthesia, risks & benefits discussed:  Anesthesia Type:  general  Patient's Preference:   Intra-op Monitoring Plan: standard ASA monitors  Intra-op Monitoring Plan Comments:   Post Op Pain Control Plan: multimodal analgesia  Post Op Pain Control Plan Comments:   Induction:   IV  Beta Blocker:  Patient is not currently on a Beta-Blocker (No further documentation required).       Informed Consent: Patient understands risks and agrees with Anesthesia plan.   Questions answered. Anesthesia consent signed with patient.  ASA Score: 2     Day of Surgery Review of History & Physical:    H&P update referred to the surgeon.         Ready For Surgery From Anesthesia Perspective.

## 2020-10-01 NOTE — DISCHARGE SUMMARY
[facility]  Pulmonology  Discharge Summary      Patient Name: Quinton Gutierrez Jr.  MRN: [id number]  Admission Date: 10/1/2020  Hospital Length of Stay: 0 days  Discharge Date and Time:  10/01/2020 10:28 AM  Attending Physician: Selene De Los Santos MD   Discharging Provider: Selene De Los Santos MD  Primary Care Provider: Primary Doctor No    HPI: patient with mild dyspnea and mediastinal adenoapthy.  Here for bronchoscopy with EBUS    Procedure(s) (LRB):  ENDOBRONCHIAL ULTRASOUND (EBUS) (N/A)    Indwelling Lines/Drains at Time of Discharge:   Lines/Drains/Airways     None                 Hospital Course: Bronchoscopy complete, see note.  Tolerated well        Significant Labs:  All pertinent labs within the past 24 hours have been reviewed.    Significant Imaging:  I have reviewed all pertinent imaging results/findings within the past 24 hours.    Pending Diagnostic Studies:     Procedure Component Value Units Date/Time    Cytology- FNA Radiology Guided, Bronch/EBUS, EUS/GI [[id number]] Collected: 10/01/20 1014    Order Status: Sent Lab Status: In process Updated: 10/01/20 1019    Leukemia/Lymphoma Screen - Lymph Node Has a separate specimen been submitted and ordered for surgical pathology? Yes [[id number]] Collected: 10/01/20 1014    Order Status: Sent Lab Status: In process Updated: 10/01/20 1019    Specimen: Lymph Node     Specimen to Pathology, Surgery Pulmonary and Thoracic [[id number]] Collected: 10/01/20 1014    Order Status: Sent Lab Status: In process Updated: 10/01/20 1019        Final Active Diagnoses:    Diagnosis Date Noted POA    PRINCIPAL PROBLEM:  Mediastinal lymphadenopathy [R59.0] 09/28/2020 Yes      Problems Resolved During this Admission:       Discharged Condition: stable    Disposition: Home or Self Care    Follow Up:  Follow-up Information     Call in 1 week to follow up.               Patient Instructions:      Diet general     Medications:  Reconciled Home Medications:      Medication List       You have not been prescribed any medications.         Selene De Los Santos MD  Pulmonology  [facility]

## 2020-10-02 LAB
BACTERIA BLD CULT: NORMAL
BACTERIA BLD CULT: NORMAL
FINAL PATHOLOGIC DIAGNOSIS: NORMAL
FLOW CYTOMETRY ANTIBODIES ANALYZED - LYMPH NODE: NORMAL
FLOW CYTOMETRY COMMENT - LYMPH NODE: NORMAL
FLOW CYTOMETRY INTERPRETATION - LYMPH NODE: NORMAL
GROSS: NORMAL
Lab: NORMAL

## 2020-10-03 LAB — BACTERIA SPEC AEROBE CULT: NORMAL

## 2020-10-05 ENCOUNTER — OFFICE VISIT (OUTPATIENT)
Dept: PRIMARY CARE CLINIC | Facility: CLINIC | Age: 45
End: 2020-10-05
Payer: COMMERCIAL

## 2020-10-05 VITALS
TEMPERATURE: 98 F | WEIGHT: 162.5 LBS | OXYGEN SATURATION: 96 % | HEART RATE: 106 BPM | BODY MASS INDEX: 25.51 KG/M2 | HEIGHT: 67 IN | DIASTOLIC BLOOD PRESSURE: 74 MMHG | SYSTOLIC BLOOD PRESSURE: 120 MMHG

## 2020-10-05 DIAGNOSIS — R91.8 LUNG INFILTRATE ON CT: ICD-10-CM

## 2020-10-05 DIAGNOSIS — R06.00 DYSPNEA, UNSPECIFIED TYPE: ICD-10-CM

## 2020-10-05 DIAGNOSIS — R93.89 ABNORMAL CT OF THE CHEST: ICD-10-CM

## 2020-10-05 DIAGNOSIS — R06.02 SOB (SHORTNESS OF BREATH): ICD-10-CM

## 2020-10-05 DIAGNOSIS — R59.0 MEDIASTINAL LYMPHADENOPATHY: ICD-10-CM

## 2020-10-05 DIAGNOSIS — Z09 HOSPITAL DISCHARGE FOLLOW-UP: Primary | ICD-10-CM

## 2020-10-05 LAB — BACTERIA SPEC ANAEROBE CULT: NORMAL

## 2020-10-05 PROCEDURE — 99204 PR OFFICE/OUTPT VISIT, NEW, LEVL IV, 45-59 MIN: ICD-10-PCS | Mod: S$GLB,,, | Performed by: FAMILY MEDICINE

## 2020-10-05 PROCEDURE — 99999 PR PBB SHADOW E&M-EST. PATIENT-LVL IV: CPT | Mod: PBBFAC,,, | Performed by: FAMILY MEDICINE

## 2020-10-05 PROCEDURE — 99204 OFFICE O/P NEW MOD 45 MIN: CPT | Mod: S$GLB,,, | Performed by: FAMILY MEDICINE

## 2020-10-05 PROCEDURE — 99999 PR PBB SHADOW E&M-EST. PATIENT-LVL IV: ICD-10-PCS | Mod: PBBFAC,,, | Performed by: FAMILY MEDICINE

## 2020-10-05 RX ORDER — PREDNISONE 20 MG/1
TABLET ORAL
COMMUNITY
Start: 2020-09-19 | End: 2020-10-07

## 2020-10-05 RX ORDER — BENZONATATE 200 MG/1
CAPSULE ORAL
COMMUNITY
Start: 2020-09-19 | End: 2020-10-07

## 2020-10-05 RX ORDER — AZITHROMYCIN 250 MG/1
TABLET, FILM COATED ORAL
COMMUNITY
Start: 2020-09-19 | End: 2020-10-07

## 2020-10-05 RX ORDER — ALBUTEROL SULFATE 90 UG/1
2 AEROSOL, METERED RESPIRATORY (INHALATION) EVERY 6 HOURS PRN
Qty: 18 G | Refills: 0 | Status: SHIPPED | OUTPATIENT
Start: 2020-10-05 | End: 2021-07-28 | Stop reason: SDUPTHER

## 2020-10-05 RX ORDER — FAMOTIDINE 40 MG/1
TABLET, FILM COATED ORAL
COMMUNITY
Start: 2020-09-19 | End: 2021-07-28

## 2020-10-05 NOTE — PROGRESS NOTES
"Subjective:   Patient ID: Quinton Gutierrez Jr. is a 45 y.o. male.    Chief Complaint: Transitional Care (hospital follow up)      HPI  45-year-old male here for hospital follow-up  Patient queried and denies any further complaints.    CT chest    "Impression:     1.  No convincing evidence of pulmonary thromboembolism.     2.  Abnormal bulky mediastinal and bilateral hilar lymph node enlargement of uncertain etiology.  Metastatic or lymphoproliferative process must be excluded.  Additional alternative considerations include autoimmune disorder such as sarcoidosis or reactive lymphadenopathy.     3.  Abnormal appearance of the lungs demonstrating extensive diffuse bilateral micronodular opacities with somewhat more confluent bibasilar consolidation and ground-glass attenuation.  Findings may relate to infectious process, including atypical infection, edema, noninfectious inflammatory process, or neoplastic process (i.e.  lymphangitic carcinomatosis).  Pulmonary consultation advised in light of aforementioned pulmonary findings and significant adenopathy.  Follow-up assessment with dedicated noncontrast high-resolution chest CT may also be of benefit further characterization of lung parenchyma.     This report was flagged in Epic as abnormal.       ALLERGIES AND MEDICATIONS: updated and reviewed.  Review of patient's allergies indicates:  No Known Allergies    Current Outpatient Medications:     albuterol (PROVENTIL/VENTOLIN HFA) 90 mcg/actuation inhaler, Inhale 2 puffs into the lungs every 6 (six) hours as needed for Wheezing. Rescue, Disp: 18 g, Rfl: 0    famotidine (PEPCID) 40 MG tablet, , Disp: , Rfl:     predniSONE (DELTASONE) 20 MG tablet, , Disp: , Rfl:     Review of Systems   Constitutional: Negative for activity change, appetite change, chills, diaphoresis, fatigue, fever and unexpected weight change.   HENT: Negative for congestion, ear discharge, ear pain, facial swelling, hearing loss, nosebleeds, postnasal " "drip, rhinorrhea, sinus pressure, sneezing, sore throat, tinnitus, trouble swallowing and voice change.    Eyes: Negative for photophobia, pain, discharge, redness, itching and visual disturbance.   Respiratory: Negative for cough, chest tightness, shortness of breath and wheezing.    Cardiovascular: Negative for chest pain, palpitations and leg swelling.   Gastrointestinal: Negative for abdominal distention, abdominal pain, anal bleeding, blood in stool, constipation, diarrhea, nausea, rectal pain and vomiting.   Endocrine: Negative for cold intolerance, heat intolerance, polydipsia, polyphagia and polyuria.   Genitourinary: Negative for difficulty urinating, dysuria and flank pain.   Musculoskeletal: Negative for arthralgias, back pain, joint swelling, myalgias and neck pain.   Skin: Negative for rash.   Neurological: Negative for dizziness, tremors, seizures, syncope, speech difficulty, weakness, light-headedness, numbness and headaches.   Psychiatric/Behavioral: Negative for behavioral problems, confusion, decreased concentration, dysphoric mood, sleep disturbance and suicidal ideas. The patient is not nervous/anxious and is not hyperactive.        Objective:     Vitals:    10/05/20 1445   BP: 120/74   Pulse: 106   Temp: 98.1 °F (36.7 °C)   TempSrc: Oral   SpO2: 96%   Weight: 73.7 kg (162 lb 7.7 oz)   Height: 5' 7" (1.702 m)   PainSc: 0-No pain     Body mass index is 25.45 kg/m².    Physical Exam  Vitals signs and nursing note reviewed.   Constitutional:       General: He is not in acute distress.     Appearance: He is well-developed. He is not diaphoretic.   HENT:      Head: Normocephalic and atraumatic.      Right Ear: Hearing, tympanic membrane, ear canal and external ear normal. No tenderness.      Left Ear: Hearing, tympanic membrane, ear canal and external ear normal. No tenderness.      Nose: Nose normal.   Eyes:      General: Lids are normal. No scleral icterus.        Right eye: No discharge.         " Left eye: No discharge.      Extraocular Movements:      Right eye: Normal extraocular motion.      Left eye: Normal extraocular motion.      Conjunctiva/sclera: Conjunctivae normal.      Right eye: Right conjunctiva is not injected.      Left eye: Left conjunctiva is not injected.      Pupils: Pupils are equal, round, and reactive to light.   Neck:      Musculoskeletal: Normal range of motion and neck supple. No edema.      Thyroid: No thyromegaly.      Vascular: No carotid bruit or JVD.      Trachea: No tracheal deviation.   Cardiovascular:      Rate and Rhythm: Normal rate and regular rhythm.      Pulses: Normal pulses.      Heart sounds: Normal heart sounds. No murmur. No friction rub.   Pulmonary:      Effort: Pulmonary effort is normal. No accessory muscle usage or respiratory distress.      Breath sounds: Normal breath sounds. No wheezing, rhonchi or rales.   Abdominal:      General: Bowel sounds are normal. There is no distension or abdominal bruit.      Palpations: Abdomen is soft. There is no mass or pulsatile mass.      Tenderness: There is no abdominal tenderness. There is no guarding or rebound. Negative signs include Miller's sign and McBurney's sign.   Lymphadenopathy:      Head:      Right side of head: No submandibular, preauricular or posterior auricular adenopathy.      Left side of head: No submandibular, preauricular or posterior auricular adenopathy.      Cervical: No cervical adenopathy.   Skin:     General: Skin is warm and dry.      Findings: No ecchymosis, erythema or rash. Rash is not urticarial.      Nails: There is no clubbing.     Neurological:      Mental Status: He is alert and oriented to person, place, and time.      GCS: GCS eye subscore is 4. GCS verbal subscore is 5. GCS motor subscore is 6.   Psychiatric:         Mood and Affect: Mood is not anxious or depressed. Affect is not angry or inappropriate.         Speech: Speech normal.         Behavior: Behavior normal. Behavior is  cooperative.         Thought Content: Thought content normal.         Assessment and Plan:   Quinton was seen today for transitional care.    Diagnoses and all orders for this visit:    Hospital discharge follow-up    Abnormal CT of the chest  -     CT Chest Without Contrast; Future  -     Ambulatory referral/consult to Pulmonology; Future    Dyspnea, unspecified type  -     Ambulatory referral/consult to Pulmonology; Future    Bilateral Lung infiltrate on CT    SOB (shortness of breath)    Mediastinal lymphadenopathy    Other orders  -     albuterol (PROVENTIL/VENTOLIN HFA) 90 mcg/actuation inhaler; Inhale 2 puffs into the lungs every 6 (six) hours as needed for Wheezing. Rescue     CT as above as per recommendations of radiology    Needs annual exam with PCP of his choice    Time spent in the evaluation and management of this patient exceeded 45min and greater than 50% of this time was in face-to-face education regarding diagnoses, medications, plan, and follow-up.      No follow-ups on file.    THIS NOTE WILL BE SHARED WITH THE PATIENT.

## 2020-10-06 ENCOUNTER — PATIENT MESSAGE (OUTPATIENT)
Dept: PULMONOLOGY | Facility: CLINIC | Age: 45
End: 2020-10-06

## 2020-10-07 ENCOUNTER — OFFICE VISIT (OUTPATIENT)
Dept: PULMONOLOGY | Facility: CLINIC | Age: 45
End: 2020-10-07
Payer: COMMERCIAL

## 2020-10-07 ENCOUNTER — LAB VISIT (OUTPATIENT)
Dept: LAB | Facility: HOSPITAL | Age: 45
End: 2020-10-07
Attending: INTERNAL MEDICINE
Payer: COMMERCIAL

## 2020-10-07 ENCOUNTER — PATIENT MESSAGE (OUTPATIENT)
Dept: PULMONOLOGY | Facility: CLINIC | Age: 45
End: 2020-10-07

## 2020-10-07 VITALS
WEIGHT: 161.81 LBS | HEIGHT: 67 IN | OXYGEN SATURATION: 95 % | BODY MASS INDEX: 25.4 KG/M2 | SYSTOLIC BLOOD PRESSURE: 120 MMHG | HEART RATE: 82 BPM | DIASTOLIC BLOOD PRESSURE: 78 MMHG

## 2020-10-07 DIAGNOSIS — R93.89 ABNORMAL CT OF THE CHEST: ICD-10-CM

## 2020-10-07 DIAGNOSIS — R06.00 DYSPNEA, UNSPECIFIED TYPE: ICD-10-CM

## 2020-10-07 DIAGNOSIS — D86.0 PULMONARY SARCOIDOSIS: Primary | ICD-10-CM

## 2020-10-07 DIAGNOSIS — R94.31 ABNORMAL EKG: ICD-10-CM

## 2020-10-07 DIAGNOSIS — D86.0 PULMONARY SARCOIDOSIS: ICD-10-CM

## 2020-10-07 PROBLEM — Z79.899 DVT PROPHYLAXIS: Status: RESOLVED | Noted: 2020-09-27 | Resolved: 2020-10-07

## 2020-10-07 PROBLEM — Z75.8 DISCHARGE PLANNING ISSUES: Status: RESOLVED | Noted: 2020-09-27 | Resolved: 2020-10-07

## 2020-10-07 LAB — 25(OH)D3+25(OH)D2 SERPL-MCNC: 8 NG/ML (ref 30–96)

## 2020-10-07 PROCEDURE — 82306 VITAMIN D 25 HYDROXY: CPT

## 2020-10-07 PROCEDURE — 99999 PR PBB SHADOW E&M-EST. PATIENT-LVL IV: CPT | Mod: PBBFAC,,, | Performed by: INTERNAL MEDICINE

## 2020-10-07 PROCEDURE — 99214 OFFICE O/P EST MOD 30 MIN: CPT | Mod: S$GLB,,, | Performed by: INTERNAL MEDICINE

## 2020-10-07 PROCEDURE — 3008F PR BODY MASS INDEX (BMI) DOCUMENTED: ICD-10-PCS | Mod: CPTII,S$GLB,, | Performed by: INTERNAL MEDICINE

## 2020-10-07 PROCEDURE — 99214 PR OFFICE/OUTPT VISIT, EST, LEVL IV, 30-39 MIN: ICD-10-PCS | Mod: S$GLB,,, | Performed by: INTERNAL MEDICINE

## 2020-10-07 PROCEDURE — 99999 PR PBB SHADOW E&M-EST. PATIENT-LVL IV: ICD-10-PCS | Mod: PBBFAC,,, | Performed by: INTERNAL MEDICINE

## 2020-10-07 PROCEDURE — 36415 COLL VENOUS BLD VENIPUNCTURE: CPT

## 2020-10-07 PROCEDURE — 3008F BODY MASS INDEX DOCD: CPT | Mod: CPTII,S$GLB,, | Performed by: INTERNAL MEDICINE

## 2020-10-07 RX ORDER — PREDNISONE 20 MG/1
20 TABLET ORAL DAILY
Qty: 30 TABLET | Refills: 1 | Status: SHIPPED | OUTPATIENT
Start: 2020-10-07 | End: 2020-11-05 | Stop reason: ALTCHOICE

## 2020-10-07 NOTE — LETTER
October 27, 2020      Eddie Payton MD  [address]  [facility] [address]           [facility]  [address]  [facility] [address]  Phone: [phone]          Patient: Quinton Gutierrez Jr.   MR Number: [id number]   YOB: 1975   Date of Visit: 10/7/2020       Dear Dr. Eddie Payton:    Thank you for referring Quinton Gutierrez to me for evaluation. Attached you will find relevant portions of my assessment and plan of care.    If you have questions, please do not hesitate to call me. I look forward to following Quinton Gutierrez along with you.    Sincerely,    Camilo Mariee MD    Enclosure  CC:  No Recipients    If you would like to receive this communication electronically, please contact [email or web address] or [phone] to request more information on [facility] access.    For providers and/or their staff who would like to refer a patient to [facility], please contact us through our one-stop-shop provider referral line, [facility] , at [phone].    If you feel you have received this communication in error or would no longer like to receive these types of communications, please e-mail [email or web address]

## 2020-10-07 NOTE — PROGRESS NOTES
Subjective:       Patient ID: Quinton Gutierrez Jr. is a 45 y.o. male.    Chief Complaint: Abnormal Ct Scan    HPI:   Quinton Gutierrez Jr. is a 45 y.o. male new to me who presents for evaluation of sarcoidosis.     Patient was recently admitted to [facility] from 9/27-28 with chest pain and dyspnea. He was also noted to have a 40lb weight loss in the last several months. Imaging revealed mediastinal LAD and bilateral GGOs. He underwent a bronchoscopy on 10/1 with Dr. De Los Santos which revealed granulomas c/w sarcoidosis. He presents today to f/u biopsy results.    Patient states his chest discomfort is improved. He denies f/c/ns but does note malaise/fatigue and perceived weakness. He denies rashes, vision changes, palpitations, myalgias, arthralgias, salivary complaints. He reports a dry cough. He denies sputum production, hemoptysis    Review of Systems   Constitutional: Positive for weight loss, activity change, appetite change and weakness. Negative for fever, chills and night sweats.   HENT: Negative for postnasal drip, sinus pressure, voice change and congestion.    Eyes: Negative for redness.   Respiratory: Negative for cough, hemoptysis, sputum production, shortness of breath, wheezing, orthopnea, asthma nighttime symptoms, dyspnea on extertion, somnolence and Paroxysmal Nocturnal Dyspnea.    Cardiovascular: Negative for chest pain, palpitations and leg swelling.   Musculoskeletal: Negative for joint swelling and myalgias.   Skin: Negative for rash.   Gastrointestinal: Negative for nausea, vomiting and acid reflux.   Neurological: Negative for syncope and weakness.   Psychiatric/Behavioral: Negative for sleep disturbance.         Social History     Tobacco Use    Smoking status: Never Smoker   Substance Use Topics    Alcohol use: Never     Frequency: Never       Review of patient's allergies indicates:  No Known Allergies  No past medical history on file.  Past Surgical History:   Procedure Laterality Date    ENDOBRONCHIAL  "ULTRASOUND N/A 10/1/2020    Procedure: ENDOBRONCHIAL ULTRASOUND (EBUS);  Surgeon: Selene De Los Santos MD;  Location: [facility] OR [address] [place] [address];  Service: Pulmonary;  Laterality: N/A;     Current Outpatient Medications on File Prior to Visit   Medication Sig    albuterol (PROVENTIL/VENTOLIN HFA) 90 mcg/actuation inhaler Inhale 2 puffs into the lungs every 6 (six) hours as needed for Wheezing. Rescue    famotidine (PEPCID) 40 MG tablet      No current facility-administered medications on file prior to visit.        Objective:      Vitals:    10/07/20 1017   BP: 120/78   BP Location: Right arm   Patient Position: Sitting   Pulse: 82   SpO2: 95%   Weight: 73.4 kg (161 lb 13.1 oz)   Height: 5' 7" (1.702 m)     Physical Exam   Constitutional: He is oriented to person, place, and time. He appears well-developed and well-nourished.   HENT:   Nose: No mucosal edema.   Mouth/Throat: Oropharynx is clear and moist. Mallampati Score: II.   Neck: Neck supple. No tracheal deviation present.   Cardiovascular: Normal rate, regular rhythm and intact distal pulses.   Pulmonary/Chest: Normal expansion, symmetric chest wall expansion, effort normal and breath sounds normal. No respiratory distress. He has no wheezes. He has no rhonchi. He has no rales.   Abdominal: He exhibits no distension.   Musculoskeletal:         General: No edema.   Lymphadenopathy: No supraclavicular adenopathy is present.     He has no cervical adenopathy.   Neurological: He is alert and oriented to person, place, and time.   Skin: Skin is warm and dry. No cyanosis or erythema. Nails show no clubbing.   Psychiatric: He has a normal mood and affect.   Nursing note and vitals reviewed.      Personal Diagnostic Review    Labs-  Ca 8.9 on 8/28  ESR- 45  CRP- 9.1    CT Chest 9/27/20- Images personally reviewed. I agree w/ the radiologist who notes;  1.  No convincing evidence of pulmonary thromboembolism.     2.  Abnormal bulky mediastinal and bilateral hilar lymph node " enlargement of uncertain etiology.  Metastatic or lymphoproliferative process must be excluded.  Additional alternative considerations include autoimmune disorder such as sarcoidosis or reactive lymphadenopathy.     3.  Abnormal appearance of the lungs demonstrating extensive diffuse bilateral micronodular opacities with somewhat more confluent bibasilar consolidation and ground-glass attenuation.  Findings may relate to infectious process, including atypical infection, edema, noninfectious inflammatory process, or neoplastic process (i.e.  lymphangitic carcinomatosis).  Pulmonary consultation advised in light of aforementioned pulmonary findings and significant adenopathy.  Follow-up assessment with dedicated noncontrast high-resolution chest CT may also be of benefit further characterization of lung parenchyma.    CXR 9/27/20- Images personally reviewed. I agree w/ the radiologist who notes;  Bilateral pulmonary opacities consistent with interstitial and alveolar infiltrates noted, findings consistent with mediastinal and hilar adenopathy noted on CT examination noted, clinical and historical correlation and follow-up is needed, referral to the recent CT report is recommended.    No flowsheet data found.        Assessment:     Problem List Items Addressed This Visit        Cardiac/Vascular    Abnormal EKG    Relevant Orders    Echo Color Flow Doppler? Yes       Immunology/Multi System    Pulmonary sarcoidosis - Primary    Current Assessment & Plan     Diagnosed via bronchoscopy on 10/1/20. EKG with rhythm abnormalities.    - Check Vit D 25 and 1,25  - TTE  - Ophtho referral  - Start pred 20mg daily given degree of weight loss, cough and parenchymal sarcoidosis  - Risks/benefits of pred discussed with patient         Relevant Medications    predniSONE (DELTASONE) 20 MG tablet    Other Relevant Orders    Ambulatory referral/consult to Ophthalmology    Echo Color Flow Doppler? Yes    Calcitriol (1,25 di-OH Vitamin D)     VITAMIN D (Completed)       Other    Abnormal CT of the chest    Current Assessment & Plan     2/2 pulmonary sarcoidosis         Dyspnea

## 2020-10-08 ENCOUNTER — PATIENT MESSAGE (OUTPATIENT)
Dept: PRIMARY CARE CLINIC | Facility: CLINIC | Age: 45
End: 2020-10-08

## 2020-10-08 NOTE — TELEPHONE ENCOUNTER
Patient's wife would like to know if there was a particular reason he was sent to another Pulmonologist. Patient would also like prescription for inhaler texted to him as discussed.

## 2020-10-09 LAB
ADEQUACY: NORMAL
COMMENT: NORMAL
FINAL PATHOLOGIC DIAGNOSIS: NORMAL
MICROSCOPIC EXAM: NORMAL
SUPPLEMENTAL DIAGNOSIS: NORMAL

## 2020-10-12 ENCOUNTER — PATIENT MESSAGE (OUTPATIENT)
Dept: PULMONOLOGY | Facility: CLINIC | Age: 45
End: 2020-10-12

## 2020-10-27 PROBLEM — R94.31 ABNORMAL EKG: Status: ACTIVE | Noted: 2020-10-27

## 2020-10-27 PROBLEM — D86.0 PULMONARY SARCOIDOSIS: Status: ACTIVE | Noted: 2020-10-27

## 2020-10-27 NOTE — ASSESSMENT & PLAN NOTE
Diagnosed via bronchoscopy on 10/1/20. EKG with rhythm abnormalities.    - Check Vit D 25 and 1,25  - TTE  - Ophtho referral  - Start pred 20mg daily given degree of weight loss, cough and parenchymal sarcoidosis  - Risks/benefits of pred discussed with patient

## 2020-11-03 LAB — FUNGUS SPEC CULT: NORMAL

## 2020-11-05 ENCOUNTER — PATIENT MESSAGE (OUTPATIENT)
Dept: PULMONOLOGY | Facility: CLINIC | Age: 45
End: 2020-11-05

## 2020-11-05 ENCOUNTER — HOSPITAL ENCOUNTER (OUTPATIENT)
Dept: RADIOLOGY | Facility: HOSPITAL | Age: 45
Discharge: HOME | End: 2020-11-05
Attending: INTERNAL MEDICINE
Payer: COMMERCIAL

## 2020-11-05 ENCOUNTER — OFFICE VISIT (OUTPATIENT)
Dept: PULMONOLOGY | Facility: CLINIC | Age: 45
End: 2020-11-05
Payer: COMMERCIAL

## 2020-11-05 VITALS
BODY MASS INDEX: 27.61 KG/M2 | DIASTOLIC BLOOD PRESSURE: 78 MMHG | WEIGHT: 175.94 LBS | SYSTOLIC BLOOD PRESSURE: 120 MMHG | HEIGHT: 67 IN | OXYGEN SATURATION: 98 % | HEART RATE: 85 BPM

## 2020-11-05 DIAGNOSIS — R94.31 ABNORMAL EKG: ICD-10-CM

## 2020-11-05 DIAGNOSIS — D86.0 PULMONARY SARCOIDOSIS: ICD-10-CM

## 2020-11-05 DIAGNOSIS — D86.0 PULMONARY SARCOIDOSIS: Primary | ICD-10-CM

## 2020-11-05 DIAGNOSIS — E55.9 VITAMIN D DEFICIENCY: ICD-10-CM

## 2020-11-05 PROCEDURE — 3008F BODY MASS INDEX DOCD: CPT | Mod: CPTII,S$GLB,, | Performed by: INTERNAL MEDICINE

## 2020-11-05 PROCEDURE — 99999 PR PBB SHADOW E&M-EST. PATIENT-LVL III: CPT | Mod: PBBFAC,,, | Performed by: INTERNAL MEDICINE

## 2020-11-05 PROCEDURE — 3008F PR BODY MASS INDEX (BMI) DOCUMENTED: ICD-10-PCS | Mod: CPTII,S$GLB,, | Performed by: INTERNAL MEDICINE

## 2020-11-05 PROCEDURE — 71046 X-RAY EXAM CHEST 2 VIEWS: CPT | Mod: TC,FY

## 2020-11-05 PROCEDURE — 99999 PR PBB SHADOW E&M-EST. PATIENT-LVL III: ICD-10-PCS | Mod: PBBFAC,,, | Performed by: INTERNAL MEDICINE

## 2020-11-05 PROCEDURE — 99214 OFFICE O/P EST MOD 30 MIN: CPT | Mod: S$GLB,,, | Performed by: INTERNAL MEDICINE

## 2020-11-05 PROCEDURE — 71046 XR CHEST PA AND LATERAL: ICD-10-PCS | Mod: 26,,, | Performed by: RADIOLOGY

## 2020-11-05 PROCEDURE — 71046 X-RAY EXAM CHEST 2 VIEWS: CPT | Mod: 26,,, | Performed by: RADIOLOGY

## 2020-11-05 PROCEDURE — 99214 PR OFFICE/OUTPT VISIT, EST, LEVL IV, 30-39 MIN: ICD-10-PCS | Mod: S$GLB,,, | Performed by: INTERNAL MEDICINE

## 2020-11-05 RX ORDER — ERGOCALCIFEROL 1.25 MG/1
50000 CAPSULE ORAL
Qty: 8 CAPSULE | Refills: 0 | Status: CANCELLED | OUTPATIENT
Start: 2020-11-05

## 2020-11-05 RX ORDER — PREDNISONE 20 MG/1
20 TABLET ORAL DAILY
Qty: 30 TABLET | Refills: 1 | Status: SHIPPED | OUTPATIENT
Start: 2020-11-05 | End: 2021-07-06 | Stop reason: SDUPTHER

## 2020-11-05 RX ORDER — CALCIUM CARB/VITAMIN D3/VIT K1 500-500-40
800 TABLET,CHEWABLE ORAL DAILY
Qty: 30 CAPSULE | Refills: 3 | Status: CANCELLED | OUTPATIENT
Start: 2021-01-01

## 2020-11-05 RX ORDER — PANTOPRAZOLE SODIUM 40 MG/1
40 TABLET, DELAYED RELEASE ORAL DAILY
Qty: 30 TABLET | Refills: 11 | Status: SHIPPED | OUTPATIENT
Start: 2020-11-05 | End: 2020-11-05 | Stop reason: CLARIF
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
## 2020-12-03 LAB
ACID FAST MOD KINY STN SPEC: NORMAL
MYCOBACTERIUM SPEC QL CULT: NORMAL

## 2021-01-14 ENCOUNTER — PATIENT MESSAGE (OUTPATIENT)
Dept: PULMONOLOGY | Facility: CLINIC | Age: 46
End: 2021-01-14

## 2021-04-16 ENCOUNTER — PATIENT MESSAGE (OUTPATIENT)
Dept: RESEARCH | Facility: HOSPITAL | Age: 46
End: 2021-04-16

## 2021-06-28 ENCOUNTER — HOSPITAL ENCOUNTER (OUTPATIENT)
Dept: CARDIOLOGY | Facility: HOSPITAL | Age: 46
Discharge: HOME | End: 2021-06-28
Attending: INTERNAL MEDICINE
Payer: COMMERCIAL

## 2021-06-28 ENCOUNTER — HOSPITAL ENCOUNTER (OUTPATIENT)
Dept: PULMONOLOGY | Facility: CLINIC | Age: 46
Discharge: HOME | End: 2021-06-28
Payer: COMMERCIAL

## 2021-06-28 ENCOUNTER — HOSPITAL ENCOUNTER (OUTPATIENT)
Dept: RADIOLOGY | Facility: HOSPITAL | Age: 46
Discharge: HOME | End: 2021-06-28
Attending: FAMILY MEDICINE
Payer: COMMERCIAL

## 2021-06-28 ENCOUNTER — OFFICE VISIT (OUTPATIENT)
Dept: PULMONOLOGY | Facility: CLINIC | Age: 46
End: 2021-06-28
Payer: COMMERCIAL

## 2021-06-28 VITALS
BODY MASS INDEX: 25.76 KG/M2 | DIASTOLIC BLOOD PRESSURE: 82 MMHG | HEIGHT: 68 IN | WEIGHT: 170 LBS | SYSTOLIC BLOOD PRESSURE: 132 MMHG | HEART RATE: 82 BPM

## 2021-06-28 VITALS
BODY MASS INDEX: 26.71 KG/M2 | OXYGEN SATURATION: 93 % | WEIGHT: 170.19 LBS | SYSTOLIC BLOOD PRESSURE: 132 MMHG | HEART RATE: 84 BPM | HEIGHT: 67 IN | DIASTOLIC BLOOD PRESSURE: 82 MMHG

## 2021-06-28 VITALS — WEIGHT: 170 LBS | BODY MASS INDEX: 25.76 KG/M2 | HEIGHT: 68 IN

## 2021-06-28 DIAGNOSIS — D86.0 PULMONARY SARCOIDOSIS: ICD-10-CM

## 2021-06-28 DIAGNOSIS — D86.0 PULMONARY SARCOIDOSIS: Primary | ICD-10-CM

## 2021-06-28 DIAGNOSIS — R93.89 ABNORMAL CT OF THE CHEST: ICD-10-CM

## 2021-06-28 DIAGNOSIS — R94.31 ABNORMAL EKG: ICD-10-CM

## 2021-06-28 LAB
ASCENDING AORTA: 3.06 CM
AV INDEX (PROSTH): 0.78
AV MEAN GRADIENT: 5 MMHG
AV PEAK GRADIENT: 9 MMHG
AV VALVE AREA: 3.08 CM2
AV VELOCITY RATIO: 0.83
BSA FOR ECHO PROCEDURE: 1.92 M2
CV ECHO LV RWT: 0.36 CM
DLCO ADJ PRE: 16.22 ML/(MIN*MMHG) (ref 23.98–37.84)
DLCO SINGLE BREATH LLN: 23.98
DLCO SINGLE BREATH PRE REF: 52.5 %
DLCO SINGLE BREATH REF: 30.91
DLCOC SBVA LLN: 3.29
DLCOC SBVA PRE REF: 102.1 %
DLCOC SBVA REF: 4.58
DLCOC SINGLE BREATH LLN: 23.98
DLCOC SINGLE BREATH PRE REF: 52.5 %
DLCOC SINGLE BREATH REF: 30.91
DLCOCSBVAULN: 5.88
DLCOCSINGLEBREATHULN: 37.84
DLCOSINGLEBREATHULN: 37.84
DLCOVA LLN: 3.29
DLCOVA PRE REF: 102.1 %
DLCOVA PRE: 4.68 ML/(MIN*MMHG*L) (ref 3.29–5.88)
DLCOVA REF: 4.58
DLCOVAULN: 5.88
DLVAADJ PRE: 4.68 ML/(MIN*MMHG*L) (ref 3.29–5.88)
DOP CALC AO PEAK VEL: 1.54 M/S
DOP CALC AO VTI: 26.38 CM
DOP CALC LVOT AREA: 3.9 CM2
DOP CALC LVOT DIAMETER: 2.24 CM
DOP CALC LVOT PEAK VEL: 1.28 M/S
DOP CALC LVOT STROKE VOLUME: 81.18 CM3
DOP CALCLVOT PEAK VEL VTI: 20.61 CM
E WAVE DECELERATION TIME: 180.35 MSEC
E/A RATIO: 1.49
E/E' RATIO: 7.17 M/S
ECHO LV POSTERIOR WALL: 0.84 CM (ref 0.6–1.1)
EJECTION FRACTION: 60 %
FEF 25 75 LLN: 1.64
FEF 25 75 PRE REF: 132.5 %
FEF 25 75 REF: 3.25
FEV05 LLN: 1.79
FEV05 REF: 2.92
FEV1 FVC LLN: 71
FEV1 FVC PRE REF: 108.9 %
FEV1 FVC REF: 81
FEV1 LLN: 2.52
FEV1 PRE REF: 78.2 %
FEV1 REF: 3.3
FRACTIONAL SHORTENING: 31 % (ref 28–44)
FVC LLN: 3.16
FVC PRE REF: 71.7 %
FVC REF: 4.08
INTERVENTRICULAR SEPTUM: 0.94 CM (ref 0.6–1.1)
IVC PRE: 2.47 L (ref 3.16–5)
IVC SINGLE BREATH LLN: 3.16
IVC SINGLE BREATH PRE REF: 60.5 %
IVC SINGLE BREATH REF: 4.08
IVCSINGLEBREATHULN: 5
LA MAJOR: 4.15 CM
LA MINOR: 3.94 CM
LA WIDTH: 3.22 CM
LEFT ATRIUM SIZE: 3.12 CM
LEFT ATRIUM VOLUME INDEX MOD: 15.2 ML/M2
LEFT ATRIUM VOLUME INDEX: 18.1 ML/M2
LEFT ATRIUM VOLUME MOD: 29.07 CM3
LEFT ATRIUM VOLUME: 34.52 CM3
LEFT INTERNAL DIMENSION IN SYSTOLE: 3.22 CM (ref 2.1–4)
LEFT VENTRICLE DIASTOLIC VOLUME INDEX: 52.27 ML/M2
LEFT VENTRICLE DIASTOLIC VOLUME: 99.84 ML
LEFT VENTRICLE MASS INDEX: 72 G/M2
LEFT VENTRICLE SYSTOLIC VOLUME INDEX: 21.7 ML/M2
LEFT VENTRICLE SYSTOLIC VOLUME: 41.53 ML
LEFT VENTRICULAR INTERNAL DIMENSION IN DIASTOLE: 4.65 CM (ref 3.5–6)
LEFT VENTRICULAR MASS: 138.13 G
LV LATERAL E/E' RATIO: 6.5 M/S
LV SEPTAL E/E' RATIO: 8 M/S
MV A" WAVE DURATION": 9.99 MSEC
MV PEAK A VEL: 0.7 M/S
MV PEAK E VEL: 1.04 M/S
MV STENOSIS PRESSURE HALF TIME: 52.3 MS
MV VALVE AREA P 1/2 METHOD: 4.21 CM2
PEF LLN: 6.14
PEF PRE REF: 127.1 %
PEF REF: 8.65
PHYSICIAN COMMENT: ABNORMAL
PISA TR MAX VEL: 2.3 M/S
PRE DLCO: 16.22 ML/(MIN*MMHG) (ref 23.98–37.84)
PRE FEF 25 75: 4.3 L/S (ref 1.64–4.86)
PRE FET 100: 6.53 SEC
PRE FEV05 REF: 77.4 %
PRE FEV1 FVC: 88.2 % (ref 70.8–91.25)
PRE FEV1: 2.58 L (ref 2.52–4.08)
PRE FEV5: 2.26 L (ref 1.79–4.06)
PRE FVC: 2.92 L (ref 3.16–5)
PRE PEF: 11 L/S (ref 6.14–11.16)
PULM VEIN S/D RATIO: 1.57
PV PEAK D VEL: 0.49 M/S
PV PEAK S VEL: 0.77 M/S
RA MAJOR: 3.98 CM
RA PRESSURE: 3 MMHG
RA WIDTH: 3.06 CM
RIGHT VENTRICULAR END-DIASTOLIC DIMENSION: 3.14 CM
RV TISSUE DOPPLER FREE WALL SYSTOLIC VELOCITY 1 (APICAL 4 CHAMBER VIEW): 20.37 CM/S
SINUS: 3.32 CM
STJ: 2.73 CM
TDI LATERAL: 0.16 M/S
TDI SEPTAL: 0.13 M/S
TDI: 0.15 M/S
TR MAX PG: 21 MMHG
TRICUSPID ANNULAR PLANE SYSTOLIC EXCURSION: 2.19 CM
TV REST PULMONARY ARTERY PRESSURE: 24 MMHG
VA PRE: 3.47 L (ref 6.59–6.59)
VA SINGLE BREATH LLN: 6.59
VA SINGLE BREATH PRE REF: 52.6 %
VA SINGLE BREATH REF: 6.59
VASINGLEBREATHULN: 6.59

## 2021-06-28 PROCEDURE — 71250 CT CHEST WITHOUT CONTRAST: ICD-10-PCS | Mod: 26,,, | Performed by: RADIOLOGY

## 2021-06-28 PROCEDURE — 93306 TTE W/DOPPLER COMPLETE: CPT | Mod: 26,,, | Performed by: INTERNAL MEDICINE

## 2021-06-28 PROCEDURE — 94729 PR C02/MEMBANE DIFFUSE CAPACITY: ICD-10-PCS | Mod: S$GLB,,, | Performed by: INTERNAL MEDICINE

## 2021-06-28 PROCEDURE — 93306 ECHO (CUPID ONLY): ICD-10-PCS | Mod: 26,,, | Performed by: INTERNAL MEDICINE

## 2021-06-28 PROCEDURE — 94729 DIFFUSING CAPACITY: CPT | Mod: S$GLB,,, | Performed by: INTERNAL MEDICINE

## 2021-06-28 PROCEDURE — 94010 BREATHING CAPACITY TEST: CPT | Mod: S$GLB,,, | Performed by: INTERNAL MEDICINE

## 2021-06-28 PROCEDURE — 1126F AMNT PAIN NOTED NONE PRSNT: CPT | Mod: S$GLB,,, | Performed by: NURSE PRACTITIONER

## 2021-06-28 PROCEDURE — 71250 CT THORAX DX C-: CPT | Mod: 26,,, | Performed by: RADIOLOGY

## 2021-06-28 PROCEDURE — 99214 OFFICE O/P EST MOD 30 MIN: CPT | Mod: S$GLB,,, | Performed by: NURSE PRACTITIONER

## 2021-06-28 PROCEDURE — 99214 PR OFFICE/OUTPT VISIT, EST, LEVL IV, 30-39 MIN: ICD-10-PCS | Mod: S$GLB,,, | Performed by: NURSE PRACTITIONER

## 2021-06-28 PROCEDURE — 1126F PR PAIN SEVERITY QUANTIFIED, NO PAIN PRESENT: ICD-10-PCS | Mod: S$GLB,,, | Performed by: NURSE PRACTITIONER

## 2021-06-28 PROCEDURE — 93306 TTE W/DOPPLER COMPLETE: CPT

## 2021-06-28 PROCEDURE — 71250 CT THORAX DX C-: CPT | Mod: TC

## 2021-06-28 PROCEDURE — 3008F PR BODY MASS INDEX (BMI) DOCUMENTED: ICD-10-PCS | Mod: CPTII,S$GLB,, | Performed by: NURSE PRACTITIONER

## 2021-06-28 PROCEDURE — 94618 PULMONARY STRESS TESTING: ICD-10-PCS | Mod: S$GLB,,, | Performed by: INTERNAL MEDICINE

## 2021-06-28 PROCEDURE — 99999 PR PBB SHADOW E&M-EST. PATIENT-LVL III: CPT | Mod: PBBFAC,,, | Performed by: NURSE PRACTITIONER

## 2021-06-28 PROCEDURE — 94618 PULMONARY STRESS TESTING: CPT | Mod: S$GLB,,, | Performed by: INTERNAL MEDICINE

## 2021-06-28 PROCEDURE — 94010 BREATHING CAPACITY TEST: ICD-10-PCS | Mod: S$GLB,,, | Performed by: INTERNAL MEDICINE

## 2021-06-28 PROCEDURE — 99999 PR PBB SHADOW E&M-EST. PATIENT-LVL III: ICD-10-PCS | Mod: PBBFAC,,, | Performed by: NURSE PRACTITIONER

## 2021-06-28 PROCEDURE — 3008F BODY MASS INDEX DOCD: CPT | Mod: CPTII,S$GLB,, | Performed by: NURSE PRACTITIONER

## 2021-06-29 ENCOUNTER — PATIENT MESSAGE (OUTPATIENT)
Dept: PULMONOLOGY | Facility: CLINIC | Age: 46
End: 2021-06-29

## 2021-06-29 ENCOUNTER — TELEPHONE (OUTPATIENT)
Dept: PRIMARY CARE CLINIC | Facility: CLINIC | Age: 46
End: 2021-06-29

## 2021-06-29 RX ORDER — FLUTICASONE PROPIONATE AND SALMETEROL 250; 50 UG/1; UG/1
1 POWDER RESPIRATORY (INHALATION) 2 TIMES DAILY
Qty: 180 EACH | Refills: 3 | Status: SHIPPED | OUTPATIENT
Start: 2021-06-29 | End: 2022-03-14 | Stop reason: ALTCHOICE

## 2021-07-01 ENCOUNTER — HOSPITAL ENCOUNTER (OUTPATIENT)
Dept: RADIOLOGY | Facility: HOSPITAL | Age: 46
Discharge: HOME | End: 2021-07-01
Attending: INTERNAL MEDICINE
Payer: COMMERCIAL

## 2021-07-01 DIAGNOSIS — D86.0 PULMONARY SARCOIDOSIS: ICD-10-CM

## 2021-07-01 PROCEDURE — 71046 X-RAY EXAM CHEST 2 VIEWS: CPT | Mod: TC,PN

## 2021-07-01 PROCEDURE — 71046 X-RAY EXAM CHEST 2 VIEWS: CPT | Mod: 26,,, | Performed by: RADIOLOGY

## 2021-07-01 PROCEDURE — 71046 XR CHEST PA AND LATERAL: ICD-10-PCS | Mod: 26,,, | Performed by: RADIOLOGY

## 2021-07-12 RX ORDER — PREDNISONE 20 MG/1
20 TABLET ORAL DAILY
Qty: 30 TABLET | Refills: 1 | Status: SHIPPED | OUTPATIENT
Start: 2021-07-12 | End: 2022-02-17

## 2021-07-28 ENCOUNTER — OFFICE VISIT (OUTPATIENT)
Dept: PRIMARY CARE CLINIC | Facility: CLINIC | Age: 46
End: 2021-07-28
Payer: COMMERCIAL

## 2021-07-28 VITALS
BODY MASS INDEX: 25.79 KG/M2 | TEMPERATURE: 98 F | OXYGEN SATURATION: 97 % | SYSTOLIC BLOOD PRESSURE: 112 MMHG | HEART RATE: 58 BPM | WEIGHT: 170.19 LBS | HEIGHT: 68 IN | DIASTOLIC BLOOD PRESSURE: 70 MMHG

## 2021-07-28 DIAGNOSIS — E55.9 VITAMIN D DEFICIENCY: ICD-10-CM

## 2021-07-28 DIAGNOSIS — D86.0 PULMONARY SARCOIDOSIS: ICD-10-CM

## 2021-07-28 DIAGNOSIS — Z00.00 ROUTINE MEDICAL EXAM: Primary | ICD-10-CM

## 2021-07-28 PROCEDURE — 1126F PR PAIN SEVERITY QUANTIFIED, NO PAIN PRESENT: ICD-10-PCS | Mod: CPTII,S$GLB,, | Performed by: FAMILY MEDICINE

## 2021-07-28 PROCEDURE — 3008F BODY MASS INDEX DOCD: CPT | Mod: CPTII,S$GLB,, | Performed by: FAMILY MEDICINE

## 2021-07-28 PROCEDURE — 99396 PREV VISIT EST AGE 40-64: CPT | Mod: S$GLB,,, | Performed by: FAMILY MEDICINE

## 2021-07-28 PROCEDURE — 99999 PR PBB SHADOW E&M-EST. PATIENT-LVL III: ICD-10-PCS | Mod: PBBFAC,,, | Performed by: FAMILY MEDICINE

## 2021-07-28 PROCEDURE — 1159F MED LIST DOCD IN RCRD: CPT | Mod: CPTII,S$GLB,, | Performed by: FAMILY MEDICINE

## 2021-07-28 PROCEDURE — 3078F DIAST BP <80 MM HG: CPT | Mod: CPTII,S$GLB,, | Performed by: FAMILY MEDICINE

## 2021-07-28 PROCEDURE — 99396 PR PREVENTIVE VISIT,EST,40-64: ICD-10-PCS | Mod: S$GLB,,, | Performed by: FAMILY MEDICINE

## 2021-07-28 PROCEDURE — 3074F SYST BP LT 130 MM HG: CPT | Mod: CPTII,S$GLB,, | Performed by: FAMILY MEDICINE

## 2021-07-28 PROCEDURE — 1126F AMNT PAIN NOTED NONE PRSNT: CPT | Mod: CPTII,S$GLB,, | Performed by: FAMILY MEDICINE

## 2021-07-28 PROCEDURE — 99999 PR PBB SHADOW E&M-EST. PATIENT-LVL III: CPT | Mod: PBBFAC,,, | Performed by: FAMILY MEDICINE

## 2021-07-28 PROCEDURE — 3008F PR BODY MASS INDEX (BMI) DOCUMENTED: ICD-10-PCS | Mod: CPTII,S$GLB,, | Performed by: FAMILY MEDICINE

## 2021-07-28 PROCEDURE — 3074F PR MOST RECENT SYSTOLIC BLOOD PRESSURE < 130 MM HG: ICD-10-PCS | Mod: CPTII,S$GLB,, | Performed by: FAMILY MEDICINE

## 2021-07-28 PROCEDURE — 1160F RVW MEDS BY RX/DR IN RCRD: CPT | Mod: CPTII,S$GLB,, | Performed by: FAMILY MEDICINE

## 2021-07-28 PROCEDURE — 3078F PR MOST RECENT DIASTOLIC BLOOD PRESSURE < 80 MM HG: ICD-10-PCS | Mod: CPTII,S$GLB,, | Performed by: FAMILY MEDICINE

## 2021-07-28 PROCEDURE — 1160F PR REVIEW ALL MEDS BY PRESCRIBER/CLIN PHARMACIST DOCUMENTED: ICD-10-PCS | Mod: CPTII,S$GLB,, | Performed by: FAMILY MEDICINE

## 2021-07-28 PROCEDURE — 1159F PR MEDICATION LIST DOCUMENTED IN MEDICAL RECORD: ICD-10-PCS | Mod: CPTII,S$GLB,, | Performed by: FAMILY MEDICINE

## 2021-07-28 RX ORDER — ALBUTEROL SULFATE 90 UG/1
2 AEROSOL, METERED RESPIRATORY (INHALATION) EVERY 6 HOURS PRN
Qty: 18 G | Refills: 11 | Status: SHIPPED | OUTPATIENT
Start: 2021-07-28 | End: 2022-04-12

## 2021-07-29 PROBLEM — R06.00 DYSPNEA: Status: RESOLVED | Noted: 2020-10-07 | Resolved: 2021-07-29

## 2021-08-03 ENCOUNTER — LAB VISIT (OUTPATIENT)
Dept: LAB | Facility: HOSPITAL | Age: 46
End: 2021-08-03
Attending: FAMILY MEDICINE
Payer: COMMERCIAL

## 2021-08-03 DIAGNOSIS — Z00.00 ROUTINE MEDICAL EXAM: ICD-10-CM

## 2021-08-03 LAB
ALBUMIN SERPL BCP-MCNC: 3.8 G/DL (ref 3.5–5.2)
ALP SERPL-CCNC: 72 U/L (ref 55–135)
ALT SERPL W/O P-5'-P-CCNC: 33 U/L (ref 10–44)
ANION GAP SERPL CALC-SCNC: 8 MMOL/L (ref 8–16)
AST SERPL-CCNC: 26 U/L (ref 10–40)
BILIRUB SERPL-MCNC: 0.8 MG/DL (ref 0.1–1)
BUN SERPL-MCNC: 14 MG/DL (ref 6–20)
CALCIUM SERPL-MCNC: 9.5 MG/DL (ref 8.7–10.5)
CHLORIDE SERPL-SCNC: 104 MMOL/L (ref 95–110)
CHOLEST SERPL-MCNC: 220 MG/DL (ref 120–199)
CHOLEST/HDLC SERPL: 3.1 {RATIO} (ref 2–5)
CO2 SERPL-SCNC: 28 MMOL/L (ref 23–29)
COMPLEXED PSA SERPL-MCNC: 0.39 NG/ML (ref 0–4)
CREAT SERPL-MCNC: 1.1 MG/DL (ref 0.5–1.4)
ERYTHROCYTE [DISTWIDTH] IN BLOOD BY AUTOMATED COUNT: 12.9 % (ref 11.5–14.5)
EST. GFR  (AFRICAN AMERICAN): >60 ML/MIN/1.73 M^2
EST. GFR  (NON AFRICAN AMERICAN): >60 ML/MIN/1.73 M^2
ESTIMATED AVG GLUCOSE: 91 MG/DL (ref 68–131)
GLUCOSE SERPL-MCNC: 118 MG/DL (ref 70–110)
HBA1C MFR BLD: 4.8 % (ref 4–5.6)
HCT VFR BLD AUTO: 44.4 % (ref 40–54)
HCV AB SERPL QL IA: NEGATIVE
HDLC SERPL-MCNC: 71 MG/DL (ref 40–75)
HDLC SERPL: 32.3 % (ref 20–50)
HGB BLD-MCNC: 13.9 G/DL (ref 14–18)
LDLC SERPL CALC-MCNC: 126.6 MG/DL (ref 63–159)
MCH RBC QN AUTO: 26.8 PG (ref 27–31)
MCHC RBC AUTO-ENTMCNC: 31.3 G/DL (ref 32–36)
MCV RBC AUTO: 86 FL (ref 82–98)
NONHDLC SERPL-MCNC: 149 MG/DL
PLATELET # BLD AUTO: 282 K/UL (ref 150–450)
PMV BLD AUTO: 10.4 FL (ref 9.2–12.9)
POTASSIUM SERPL-SCNC: 3.4 MMOL/L (ref 3.5–5.1)
PROT SERPL-MCNC: 7.2 G/DL (ref 6–8.4)
RBC # BLD AUTO: 5.18 M/UL (ref 4.6–6.2)
SODIUM SERPL-SCNC: 140 MMOL/L (ref 136–145)
TRIGL SERPL-MCNC: 112 MG/DL (ref 30–150)
TSH SERPL DL<=0.005 MIU/L-ACNC: 1.73 UIU/ML (ref 0.4–4)
WBC # BLD AUTO: 4.85 K/UL (ref 3.9–12.7)

## 2021-08-03 PROCEDURE — 36415 COLL VENOUS BLD VENIPUNCTURE: CPT | Mod: PN | Performed by: FAMILY MEDICINE

## 2021-08-03 PROCEDURE — 83036 HEMOGLOBIN GLYCOSYLATED A1C: CPT | Performed by: FAMILY MEDICINE

## 2021-08-03 PROCEDURE — 86803 HEPATITIS C AB TEST: CPT | Performed by: FAMILY MEDICINE

## 2021-08-03 PROCEDURE — 80053 COMPREHEN METABOLIC PANEL: CPT | Performed by: FAMILY MEDICINE

## 2021-08-03 PROCEDURE — 84443 ASSAY THYROID STIM HORMONE: CPT | Performed by: FAMILY MEDICINE

## 2021-08-03 PROCEDURE — 84153 ASSAY OF PSA TOTAL: CPT | Performed by: FAMILY MEDICINE

## 2021-08-03 PROCEDURE — 85027 COMPLETE CBC AUTOMATED: CPT | Performed by: FAMILY MEDICINE

## 2021-08-03 PROCEDURE — 80061 LIPID PANEL: CPT | Performed by: FAMILY MEDICINE

## 2021-11-12 ENCOUNTER — CLINICAL SUPPORT (OUTPATIENT)
Dept: OTHER | Facility: CLINIC | Age: 46
End: 2021-11-12
Payer: COMMERCIAL

## 2021-11-12 DIAGNOSIS — Z00.8 ENCOUNTER FOR OTHER GENERAL EXAMINATION: ICD-10-CM

## 2021-11-12 PROCEDURE — 82947 PR  ASSAY QUANTITATIVE,BLOOD GLUCOSE: ICD-10-PCS | Mod: QW,S$GLB,, | Performed by: INTERNAL MEDICINE

## 2021-11-12 PROCEDURE — 99401 PR PREVENT COUNSEL,INDIV,15 MIN: ICD-10-PCS | Mod: 25,S$GLB,, | Performed by: INTERNAL MEDICINE

## 2021-11-12 PROCEDURE — 80061 LIPID PANEL: CPT | Mod: QW,S$GLB,, | Performed by: INTERNAL MEDICINE

## 2021-11-12 PROCEDURE — 80061 PR  LIPID PANEL: ICD-10-PCS | Mod: QW,S$GLB,, | Performed by: INTERNAL MEDICINE

## 2021-11-12 PROCEDURE — 99401 PREV MED CNSL INDIV APPRX 15: CPT | Mod: 25,S$GLB,, | Performed by: INTERNAL MEDICINE

## 2021-11-12 PROCEDURE — 82947 ASSAY GLUCOSE BLOOD QUANT: CPT | Mod: QW,S$GLB,, | Performed by: INTERNAL MEDICINE

## 2021-11-13 VITALS — HEIGHT: 66 IN | BODY MASS INDEX: 27.47 KG/M2

## 2021-11-13 LAB
HDLC SERPL-MCNC: 58 MG/DL
POC CHOLESTEROL, LDL (DOCK): 143 MG/DL
POC CHOLESTEROL, TOTAL: 212 MG/DL
POC GLUCOSE, FASTING: 96 MG/DL (ref 60–110)
TRIGL SERPL-MCNC: 58 MG/DL

## 2022-02-14 ENCOUNTER — PATIENT MESSAGE (OUTPATIENT)
Dept: PRIMARY CARE CLINIC | Facility: CLINIC | Age: 47
End: 2022-02-14
Payer: COMMERCIAL

## 2022-02-15 ENCOUNTER — TELEPHONE (OUTPATIENT)
Dept: PRIMARY CARE CLINIC | Facility: CLINIC | Age: 47
End: 2022-02-15
Payer: COMMERCIAL

## 2022-02-15 ENCOUNTER — PATIENT MESSAGE (OUTPATIENT)
Dept: PRIMARY CARE CLINIC | Facility: CLINIC | Age: 47
End: 2022-02-15
Payer: COMMERCIAL

## 2022-02-15 NOTE — TELEPHONE ENCOUNTER
Provider message:  I am not just sending in a prescription for prednisone.  Prescription refill denied.  He needs an evaluation by physician ASAP.  If he is that short of breath then he needs to go to the emergency department  immediately

## 2022-02-16 NOTE — PROGRESS NOTES
[facility] - SAME DAY APPOINTMENT  Progress Note    PRESENTING HISTORY     PCP: Eddie Payton MD    Chief Complaint/Reason for Visit:   No chief complaint on file.      History of Present Illness & ROS : Mr. Quinton Gutierrez Jr. is a 46 y.o. male.    Same day appt  Pleasant gentleman.  New to this provider and clinic.  Shortness of breath for the past month. COVID in December, recovered.     Using Albuterol inhaler. Using Advair.   Don't like taking high dose steroids, 'or any medications'.  Steroids 'makes my attitude bad but need them; know my body and when this conditions is flaring up'. Makes it hard to have a full conversation. Fell off from being followed by Pulmonary. Interested in returning.     No recent travel, no known sick contacts, no fevers, no cough, but 'short of breath'. No muscle aches or pains. No joint discomforts.     Review of Systems:  Eyes: denies visual changes at this time denies floaters   ENT: no nasal congestion or sore throat  Respiratory: no cough or shorness of breath  Cardiovascular: no chest pain or palpitations  Gastrointestinal: no nausea or vomiting, no abdominal pain or change in bowel habits  Genitourinary: no hematuria or dysuria; denies frequency  Hematologic/Lymphatic: no easy bruising or lymphadenopathy  Musculoskeletal: no arthralgias or myalgias  Neurological: no seizures or tremors  Endocrine: no heat or cold intolerance      PAST HISTORY:     No past medical history on file.    Past Surgical History:   Procedure Laterality Date    ENDOBRONCHIAL ULTRASOUND N/A 10/1/2020    Procedure: ENDOBRONCHIAL ULTRASOUND (EBUS);  Surgeon: Selene De Los Santos MD;  Location: [facility] OR [address] [place] [address];  Service: Pulmonary;  Laterality: N/A;       No family history on file.    Social History     Socioeconomic History    Marital status:    Tobacco Use    Smoking status: Never Smoker   Substance and Sexual Activity    Alcohol use: Never       MEDICATIONS & ALLERGIES:     Current  Outpatient Medications on File Prior to Visit   Medication Sig Dispense Refill    albuterol (PROVENTIL/VENTOLIN HFA) 90 mcg/actuation inhaler Inhale 2 puffs into the lungs every 6 (six) hours as needed for Wheezing. Rescue 18 g 11    fluticasone-salmeterol diskus inhaler 250-50 mcg Inhale 1 puff into the lungs 2 (two) times daily. Controller 180 each 3    predniSONE (DELTASONE) 20 MG tablet Take 1 tablet (20 mg total) by mouth once daily. 30 tablet 1     No current facility-administered medications on file prior to visit.        Review of patient's allergies indicates:  No Known Allergies    Medications Reconciliation:   I have reconciled the patient's home medications with the patient/family. I have updated all changes.  Refer to After-Visit Medication List.    OBJECTIVE:     Vital Signs:  There were no vitals filed for this visit.  Wt Readings from Last 3 Encounters:   07/28/21 0939 77.2 kg (170 lb 3.1 oz)   06/28/21 1632 77.1 kg (170 lb)   06/28/21 1143 77.1 kg (170 lb)     There is no height or weight on file to calculate BMI.   Wt Readings from Last 3 Encounters:   02/17/22 70.6 kg (155 lb 10.3 oz)   07/28/21 77.2 kg (170 lb 3.1 oz)   06/28/21 77.1 kg (170 lb)     Temp Readings from Last 3 Encounters:   07/28/21 97.8 °F (36.6 °C) (Oral)   10/05/20 98.1 °F (36.7 °C) (Oral)   10/01/20 98.2 °F (36.8 °C) (Temporal)     BP Readings from Last 3 Encounters:   07/28/21 112/70   06/28/21 132/82   06/28/21 132/82     Pulse Readings from Last 3 Encounters:   02/17/22 72   07/28/21 (!) 58   06/28/21 82       Physical Exam:  General: Well developed, well nourished. No distress.  HEENT: Head is normocephalic, atraumatic; ears are normal.   Eyes: Clear conjunctiva.  Neck: Supple, symmetrical neck; trachea midline.  Lungs: Clear to auscultation bilaterally and normal respiratory effort.  Cardiovascular: Heart with regular rate and rhythm. No murmurs, gallops or rubs  Extremities: No LE edema. Pulses 2+ and symmetric.    Skin: Skin color, texture, turgor normal. No rashes.  Musculoskeletal: Normal gait.   Lymph Nodes: No cervical or supraclavicular adenopathy.  Neurologic: Normal strength and tone. No focal numbness or weakness.   Psychiatric: Not depressed.        Laboratory  Lab Results   Component Value Date    WBC 4.85 08/03/2021    HGB 13.9 (L) 08/03/2021    HCT 44.4 08/03/2021     08/03/2021    CHOL 220 (H) 08/03/2021    TRIG 112 08/03/2021    HDL 71 08/03/2021    ALT 33 08/03/2021    AST 26 08/03/2021     08/03/2021    K 3.4 (L) 08/03/2021     08/03/2021    CREATININE 1.1 08/03/2021    BUN 14 08/03/2021    CO2 28 08/03/2021    TSH 1.730 08/03/2021    PSA 0.39 08/03/2021    HGBA1C 4.8 08/03/2021      CXR  Impression:     Diffuse bilateral micro nodules, progressed when compared to chest radiograph 11/05/2020.  Mild prominence of the bilateral maría.  No acute abnormality.     Electronically signed by resident: Kathy Romero  Date:                                            07/01/2021  Time:                                           08:52     Electronically signed by: Perez Taveras MD  Date:                                            07/01/2021  Time:                                           09:03  ASSESSMENT & PLAN:     Same day appt.     Notations made to chart in regards on 2/16/2022.     SOB (shortness of breath)  Acute on Chronic Dyspnea:  Pulmonary Sarcoidosis   *Last seen by Pulmonary Med in 6/2021; notations made to tapering of steroids due to altering mood, failed ASIM; consideration being given to trial of ICS / LABA vs continuing steroids per Pulmonary last June, but failed to follow ups and long term plan pending to be determined.   Sating today: 95-96 % RA  ` continue PRN Albuterol   ` continue Advair   ` needs continued Pulm follow up (not seen since 6/2021)   -     X-Ray Chest PA And Lateral; Future; Expected date: 02/17/2022  -     Ambulatory referral/consult to Pulmonology; Future; Expected  date: 02/24/2022    (Will extend a lower dose of oral steroids over a period of 10 days vs 5 days at a higher dose; have explained this to the patient at length and in agreement at this time; he will follow up with his Pulmonary medicine team and PCP for further mgt)  -     predniSONE (DELTASONE) 10 MG tablet; Take 2 tabs by mouth daily x 2 days, then 1 tab daily x 8 days, then stop  Dispense: 12 tablet; Refill: 1      *This is a Same Day appt and has been advised to PCP follow up at this time. If symptoms persist, may report to the ER for more emergent medical care as instructed.     Future Appointments   Date Time Provider Department Center   2/17/2022  8:00 AM [id number] 485 LB LIMIT [facility]        Medication List          Accurate as of February 17, 2022  7:30 AM. If you have any questions, ask your nurse or doctor.            CHANGE how you take these medications    predniSONE 10 MG tablet  Commonly known as: DELTASONE  Take 2 tabs by mouth daily x 2 days, then 1 tab daily x 8 days, then stop  What changed:   · medication strength  · how much to take  · how to take this  · when to take this  · additional instructions  Changed by: CAMERON Motley        CONTINUE taking these medications    albuterol 90 mcg/actuation inhaler  Commonly known as: PROVENTIL/VENTOLIN HFA  Inhale 2 puffs into the lungs every 6 (six) hours as needed for Wheezing. Rescue     fluticasone-salmeterol 250-50 mcg/dose 250-50 mcg/dose diskus inhaler  Commonly known as: ADVAIR  Inhale 1 puff into the lungs 2 (two) times daily. Controller           Where to Get Your Medications      These medications were sent to [id number] - [facility] [address] AT [address]  [address], [facility] [address]    Phone: [phone]   · predniSONE 10 MG tablet       Signing Physician:  CAMERON Motley

## 2022-02-17 ENCOUNTER — OFFICE VISIT (OUTPATIENT)
Dept: INTERNAL MEDICINE | Facility: CLINIC | Age: 47
End: 2022-02-17
Payer: COMMERCIAL

## 2022-02-17 ENCOUNTER — HOSPITAL ENCOUNTER (OUTPATIENT)
Dept: RADIOLOGY | Facility: HOSPITAL | Age: 47
Discharge: HOME | End: 2022-02-17
Attending: NURSE PRACTITIONER
Payer: COMMERCIAL

## 2022-02-17 VITALS
OXYGEN SATURATION: 95 % | BODY MASS INDEX: 25.12 KG/M2 | WEIGHT: 155.63 LBS | HEART RATE: 72 BPM | RESPIRATION RATE: 18 BRPM

## 2022-02-17 DIAGNOSIS — R06.02 SOB (SHORTNESS OF BREATH): Primary | ICD-10-CM

## 2022-02-17 DIAGNOSIS — D86.0 PULMONARY SARCOIDOSIS: ICD-10-CM

## 2022-02-17 DIAGNOSIS — R06.02 SOB (SHORTNESS OF BREATH): ICD-10-CM

## 2022-02-17 DIAGNOSIS — R06.02 SHORTNESS OF BREATH: ICD-10-CM

## 2022-02-17 LAB — SARS-COV-2 RNA RESP QL NAA+PROBE: NOT DETECTED

## 2022-02-17 PROCEDURE — 99214 OFFICE O/P EST MOD 30 MIN: CPT | Mod: S$GLB,,, | Performed by: NURSE PRACTITIONER

## 2022-02-17 PROCEDURE — 3008F PR BODY MASS INDEX (BMI) DOCUMENTED: ICD-10-PCS | Mod: CPTII,S$GLB,, | Performed by: NURSE PRACTITIONER

## 2022-02-17 PROCEDURE — U0003 INFECTIOUS AGENT DETECTION BY NUCLEIC ACID (DNA OR RNA); SEVERE ACUTE RESPIRATORY SYNDROME CORONAVIRUS 2 (SARS-COV-2) (CORONAVIRUS DISEASE [COVID-19]), AMPLIFIED PROBE TECHNIQUE, MAKING USE OF HIGH THROUGHPUT TECHNOLOGIES AS DESCRIBED BY CMS-2020-01-R: HCPCS | Performed by: NURSE PRACTITIONER

## 2022-02-17 PROCEDURE — 71046 XR CHEST PA AND LATERAL: ICD-10-PCS | Mod: 26,,, | Performed by: RADIOLOGY

## 2022-02-17 PROCEDURE — U0005 INFEC AGEN DETEC AMPLI PROBE: HCPCS | Performed by: NURSE PRACTITIONER

## 2022-02-17 PROCEDURE — 99214 PR OFFICE/OUTPT VISIT, EST, LEVL IV, 30-39 MIN: ICD-10-PCS | Mod: S$GLB,,, | Performed by: NURSE PRACTITIONER

## 2022-02-17 PROCEDURE — 99999 PR PBB SHADOW E&M-EST. PATIENT-LVL III: ICD-10-PCS | Mod: PBBFAC,,, | Performed by: NURSE PRACTITIONER

## 2022-02-17 PROCEDURE — 71046 X-RAY EXAM CHEST 2 VIEWS: CPT | Mod: 26,,, | Performed by: RADIOLOGY

## 2022-02-17 PROCEDURE — 71046 X-RAY EXAM CHEST 2 VIEWS: CPT | Mod: TC

## 2022-02-17 PROCEDURE — 3008F BODY MASS INDEX DOCD: CPT | Mod: CPTII,S$GLB,, | Performed by: NURSE PRACTITIONER

## 2022-02-17 PROCEDURE — 99999 PR PBB SHADOW E&M-EST. PATIENT-LVL III: CPT | Mod: PBBFAC,,, | Performed by: NURSE PRACTITIONER

## 2022-02-17 RX ORDER — PREDNISONE 10 MG/1
TABLET ORAL
Qty: 12 TABLET | Refills: 1 | Status: SHIPPED | OUTPATIENT
Start: 2022-02-17 | End: 2022-03-14 | Stop reason: SDUPTHER

## 2022-03-14 ENCOUNTER — LAB VISIT (OUTPATIENT)
Dept: LAB | Facility: HOSPITAL | Age: 47
End: 2022-03-14
Attending: FAMILY MEDICINE
Payer: COMMERCIAL

## 2022-03-14 ENCOUNTER — OFFICE VISIT (OUTPATIENT)
Dept: PRIMARY CARE CLINIC | Facility: CLINIC | Age: 47
End: 2022-03-14
Payer: COMMERCIAL

## 2022-03-14 VITALS
OXYGEN SATURATION: 97 % | BODY MASS INDEX: 26.01 KG/M2 | TEMPERATURE: 98 F | HEART RATE: 81 BPM | WEIGHT: 161.81 LBS | DIASTOLIC BLOOD PRESSURE: 65 MMHG | SYSTOLIC BLOOD PRESSURE: 118 MMHG | HEIGHT: 66 IN | RESPIRATION RATE: 18 BRPM

## 2022-03-14 DIAGNOSIS — D86.0 PULMONARY SARCOIDOSIS: Primary | ICD-10-CM

## 2022-03-14 DIAGNOSIS — R63.4 WEIGHT LOSS: ICD-10-CM

## 2022-03-14 DIAGNOSIS — Z51.81 MEDICATION MONITORING ENCOUNTER: ICD-10-CM

## 2022-03-14 DIAGNOSIS — D86.0 PULMONARY SARCOIDOSIS: ICD-10-CM

## 2022-03-14 LAB
ALBUMIN SERPL BCP-MCNC: 3.4 G/DL (ref 3.5–5.2)
ALP SERPL-CCNC: 76 U/L (ref 55–135)
ALT SERPL W/O P-5'-P-CCNC: 24 U/L (ref 10–44)
ANION GAP SERPL CALC-SCNC: 9 MMOL/L (ref 8–16)
AST SERPL-CCNC: 19 U/L (ref 10–40)
BASOPHILS # BLD AUTO: 0.03 K/UL (ref 0–0.2)
BASOPHILS NFR BLD: 0.5 % (ref 0–1.9)
BILIRUB SERPL-MCNC: 0.6 MG/DL (ref 0.1–1)
BUN SERPL-MCNC: 12 MG/DL (ref 6–20)
CALCIUM SERPL-MCNC: 9.7 MG/DL (ref 8.7–10.5)
CHLORIDE SERPL-SCNC: 105 MMOL/L (ref 95–110)
CO2 SERPL-SCNC: 26 MMOL/L (ref 23–29)
CREAT SERPL-MCNC: 0.9 MG/DL (ref 0.5–1.4)
DIFFERENTIAL METHOD: ABNORMAL
EOSINOPHIL # BLD AUTO: 0.3 K/UL (ref 0–0.5)
EOSINOPHIL NFR BLD: 4.3 % (ref 0–8)
ERYTHROCYTE [DISTWIDTH] IN BLOOD BY AUTOMATED COUNT: 12.8 % (ref 11.5–14.5)
EST. GFR  (AFRICAN AMERICAN): >60 ML/MIN/1.73 M^2
EST. GFR  (NON AFRICAN AMERICAN): >60 ML/MIN/1.73 M^2
GLUCOSE SERPL-MCNC: 92 MG/DL (ref 70–110)
HCT VFR BLD AUTO: 44 % (ref 40–54)
HGB BLD-MCNC: 13.6 G/DL (ref 14–18)
IMM GRANULOCYTES # BLD AUTO: 0.02 K/UL (ref 0–0.04)
IMM GRANULOCYTES NFR BLD AUTO: 0.3 % (ref 0–0.5)
LYMPHOCYTES # BLD AUTO: 0.8 K/UL (ref 1–4.8)
LYMPHOCYTES NFR BLD: 12.5 % (ref 18–48)
MCH RBC QN AUTO: 26.7 PG (ref 27–31)
MCHC RBC AUTO-ENTMCNC: 30.9 G/DL (ref 32–36)
MCV RBC AUTO: 86 FL (ref 82–98)
MONOCYTES # BLD AUTO: 0.7 K/UL (ref 0.3–1)
MONOCYTES NFR BLD: 10.9 % (ref 4–15)
NEUTROPHILS # BLD AUTO: 4.5 K/UL (ref 1.8–7.7)
NEUTROPHILS NFR BLD: 71.5 % (ref 38–73)
NRBC BLD-RTO: 0 /100 WBC
PLATELET # BLD AUTO: 302 K/UL (ref 150–450)
PMV BLD AUTO: 10 FL (ref 9.2–12.9)
POTASSIUM SERPL-SCNC: 3.7 MMOL/L (ref 3.5–5.1)
PROT SERPL-MCNC: 6.9 G/DL (ref 6–8.4)
RBC # BLD AUTO: 5.09 M/UL (ref 4.6–6.2)
SODIUM SERPL-SCNC: 140 MMOL/L (ref 136–145)
T4 FREE SERPL-MCNC: 1 NG/DL (ref 0.71–1.51)
TSH SERPL DL<=0.005 MIU/L-ACNC: 1.47 UIU/ML (ref 0.4–4)
WBC # BLD AUTO: 6.23 K/UL (ref 3.9–12.7)

## 2022-03-14 PROCEDURE — 99203 OFFICE O/P NEW LOW 30 MIN: CPT | Mod: S$GLB,,, | Performed by: FAMILY MEDICINE

## 2022-03-14 PROCEDURE — 85025 COMPLETE CBC W/AUTO DIFF WBC: CPT | Performed by: FAMILY MEDICINE

## 2022-03-14 PROCEDURE — 3078F PR MOST RECENT DIASTOLIC BLOOD PRESSURE < 80 MM HG: ICD-10-PCS | Mod: CPTII,S$GLB,, | Performed by: FAMILY MEDICINE

## 2022-03-14 PROCEDURE — 1159F MED LIST DOCD IN RCRD: CPT | Mod: CPTII,S$GLB,, | Performed by: FAMILY MEDICINE

## 2022-03-14 PROCEDURE — 80053 COMPREHEN METABOLIC PANEL: CPT | Performed by: FAMILY MEDICINE

## 2022-03-14 PROCEDURE — 3008F PR BODY MASS INDEX (BMI) DOCUMENTED: ICD-10-PCS | Mod: CPTII,S$GLB,, | Performed by: FAMILY MEDICINE

## 2022-03-14 PROCEDURE — 84443 ASSAY THYROID STIM HORMONE: CPT | Performed by: FAMILY MEDICINE

## 2022-03-14 PROCEDURE — 84439 ASSAY OF FREE THYROXINE: CPT | Performed by: FAMILY MEDICINE

## 2022-03-14 PROCEDURE — 3074F SYST BP LT 130 MM HG: CPT | Mod: CPTII,S$GLB,, | Performed by: FAMILY MEDICINE

## 2022-03-14 PROCEDURE — 3074F PR MOST RECENT SYSTOLIC BLOOD PRESSURE < 130 MM HG: ICD-10-PCS | Mod: CPTII,S$GLB,, | Performed by: FAMILY MEDICINE

## 2022-03-14 PROCEDURE — 1160F RVW MEDS BY RX/DR IN RCRD: CPT | Mod: CPTII,S$GLB,, | Performed by: FAMILY MEDICINE

## 2022-03-14 PROCEDURE — 1160F PR REVIEW ALL MEDS BY PRESCRIBER/CLIN PHARMACIST DOCUMENTED: ICD-10-PCS | Mod: CPTII,S$GLB,, | Performed by: FAMILY MEDICINE

## 2022-03-14 PROCEDURE — 99999 PR PBB SHADOW E&M-EST. PATIENT-LVL V: CPT | Mod: PBBFAC,,, | Performed by: FAMILY MEDICINE

## 2022-03-14 PROCEDURE — 1159F PR MEDICATION LIST DOCUMENTED IN MEDICAL RECORD: ICD-10-PCS | Mod: CPTII,S$GLB,, | Performed by: FAMILY MEDICINE

## 2022-03-14 PROCEDURE — 3008F BODY MASS INDEX DOCD: CPT | Mod: CPTII,S$GLB,, | Performed by: FAMILY MEDICINE

## 2022-03-14 PROCEDURE — 36415 COLL VENOUS BLD VENIPUNCTURE: CPT | Mod: PN | Performed by: FAMILY MEDICINE

## 2022-03-14 PROCEDURE — 99999 PR PBB SHADOW E&M-EST. PATIENT-LVL V: ICD-10-PCS | Mod: PBBFAC,,, | Performed by: FAMILY MEDICINE

## 2022-03-14 PROCEDURE — 3078F DIAST BP <80 MM HG: CPT | Mod: CPTII,S$GLB,, | Performed by: FAMILY MEDICINE

## 2022-03-14 PROCEDURE — 99203 PR OFFICE/OUTPT VISIT, NEW, LEVL III, 30-44 MIN: ICD-10-PCS | Mod: S$GLB,,, | Performed by: FAMILY MEDICINE

## 2022-03-14 RX ORDER — COVID-19 TEST SPECIMEN COLLECT
MISCELLANEOUS MISCELLANEOUS
COMMUNITY
Start: 2021-12-23 | End: 2022-03-14 | Stop reason: ALTCHOICE

## 2022-03-14 RX ORDER — PREDNISONE 10 MG/1
10 TABLET ORAL DAILY
Qty: 90 TABLET | Refills: 1 | Status: SHIPPED | OUTPATIENT
Start: 2022-03-14 | End: 2022-10-17

## 2022-03-14 NOTE — PATIENT INSTRUCTIONS
Get fasting blood work  Refilled prednisone at 10mg daily  Schedule appt with pulmonology - referral sent  Pneumonia shot today and schedule COVID boost

## 2022-03-14 NOTE — PROGRESS NOTES
[facility] Note    Chief Complaint      Chief Complaint   Patient presents with    Establish Care       History of Present Illness      Quinton Gutierrez Jr. is a 46 y.o. male with chronic conditions of sarcoidosiswho presents today for:  Est care and sarcoid flare    Increasing SOB since out of steroid.  Has also has weight loss despite eating. No other symptoms.     Immunizations:   COVID- due for boost   Flu- declines   Shingles- at 52   Pneumonia- due   Tetanus- due      Patient Care Team:  Britney Britton MD as PCP - General (Internal Medicine)     Health Maintenance:  Immunization History   Administered Date(s) Administered    COVID-19, MRNA, LN-S, PF (MODERNA FULL 0.5 ML DOSE) 03/25/2021, 04/21/2021    Pneumococcal Conjugate - 13 Valent 03/14/2022      Health Maintenance   Topic Date Due    TETANUS VACCINE  Never done    Lipid Panel  11/12/2026    Hepatitis C Screening  Completed        Past Medical History:  History reviewed. No pertinent past medical history.    Past Surgical History:   has a past surgical history that includes Endobronchial ultrasound (N/A, 10/1/2020).    Family History:  family history is not on file.     Social History:  Social History     Tobacco Use    Smoking status: Never Smoker    Smokeless tobacco: Never Used   Substance Use Topics    Alcohol use: Yes     Alcohol/week: 2.0 standard drinks     Types: 2 Standard drinks or equivalent per week    Drug use: Never       Review of Systems   Constitutional: Negative for fever.   Respiratory: Positive for shortness of breath.    Cardiovascular: Negative for chest pain.   Gastrointestinal: Negative for change in bowel habit and change in bowel habit.   Genitourinary: Negative for bladder incontinence.        Medications:    Current Outpatient Medications:     albuterol (PROVENTIL/VENTOLIN HFA) 90 mcg/actuation inhaler, Inhale 2 puffs into the lungs every 6 (six) hours as needed for Wheezing. Rescue, Disp: 18 g, Rfl:  "11    pneumoc 13-yuri conj-dip cr,PF, (PREVNAR 13, PF,) 0.5 mL Syrg, Inject into the muscle., Disp: 0.5 mL, Rfl: 0    predniSONE (DELTASONE) 10 MG tablet, Take 1 tablet (10 mg total) by mouth once daily., Disp: 90 tablet, Rfl: 1     Allergies:  Review of patient's allergies indicates:  No Known Allergies    Physical Exam      Vital Signs  Temp: 98 °F (36.7 °C)  Pulse: 81  Resp: 18  SpO2: 97 %  BP: 118/65  BP Location: Left arm  Patient Position: Sitting  Pain Score: 0-No pain  Height and Weight  Height: 5' 6" (167.6 cm)  Weight: 73.4 kg (161 lb 13.1 oz)  BSA (Calculated - sq m): 1.85 sq meters  BMI (Calculated): 26.1  Weight in (lb) to have BMI = 25: 154.6      Patient Position: Sitting      Physical Exam  Constitutional:       General: He is not in acute distress.     Appearance: Normal appearance.   HENT:      Right Ear: Tympanic membrane, ear canal and external ear normal.      Left Ear: Tympanic membrane, ear canal and external ear normal.      Nose: Nose normal.      Mouth/Throat:      Mouth: Mucous membranes are moist.      Pharynx: Oropharynx is clear. No oropharyngeal exudate or posterior oropharyngeal erythema.   Eyes:      Extraocular Movements: Extraocular movements intact.      Conjunctiva/sclera: Conjunctivae normal.      Pupils: Pupils are equal, round, and reactive to light.   Cardiovascular:      Rate and Rhythm: Normal rate and regular rhythm.      Pulses: Normal pulses.      Heart sounds: No murmur heard.    No friction rub. No gallop.   Pulmonary:      Effort: Pulmonary effort is normal.      Breath sounds: No wheezing, rhonchi or rales.   Abdominal:      General: Abdomen is flat. Bowel sounds are normal. There is no distension.      Palpations: Abdomen is soft. There is no mass.      Tenderness: There is no abdominal tenderness.   Musculoskeletal:      Cervical back: Neck supple.      Right lower leg: No edema.      Left lower leg: No edema.   Lymphadenopathy:      Cervical: No cervical " adenopathy.   Skin:     General: Skin is warm and dry.   Neurological:      General: No focal deficit present.      Mental Status: He is alert.   Psychiatric:         Mood and Affect: Mood normal.         Behavior: Behavior normal.          Laboratory:  CBC:  Recent Labs   Lab 09/28/20  0650 08/03/21  0851 03/14/22  1113   WBC 5.01 4.85 6.23   RBC 5.34 5.18 5.09   Hemoglobin 14.0 13.9 L 13.6 L   Hematocrit 43.7 44.4 44.0   Platelets 375 H 282 302   MCV 82 86 86   MCH 26.2 L 26.8 L 26.7 L   MCHC 32.0 31.3 L 30.9 L       CMP:  Recent Labs   Lab 09/28/20  0650 08/03/21  0851 03/14/22  1113   Glucose 98 118 H 92   Calcium 8.9 9.5 9.7   Albumin 3.0 L 3.8 3.4 L   Total Protein 6.6 7.2 6.9   Sodium 136 140 140   Potassium 4.2 3.4 L 3.7   CO2 27 28 26   Chloride 103 104 105   BUN 13 14 12   Creatinine 0.9 1.1 0.9   Alkaline Phosphatase 111 72 76   ALT 57 H 33 24   AST 42 H 26 19   Total Bilirubin 0.6 0.8 0.6           URINALYSIS:         LIPIDS:  Recent Labs   Lab 09/28/20  0650 08/03/21  0851 03/14/22  1113   TSH 1.065 1.730 1.469   HDL  --  71  --    Cholesterol  --  220 H  --    Triglycerides  --  112  --    LDL Cholesterol  --  126.6  --    HDL/Cholesterol Ratio  --  32.3  --    Non-HDL Cholesterol  --  149  --    Total Cholesterol/HDL Ratio  --  3.1  --        TSH:  Recent Labs   Lab 09/28/20  0650 08/03/21  0851 03/14/22  1113   TSH 1.065 1.730 1.469       A1C:  Recent Labs   Lab 08/03/21  0851   Hemoglobin A1C 4.8       Urine Microalbumin/Cr:          Other:   Recent Labs   Lab 10/07/20  1113   Vit D, 25-Hydroxy 8 L     Recent Labs   Lab 09/28/20  0650 08/03/21  0851   PSA, Screen 0.46 0.39   Hepatitis C Ab  --  Negative       Assessment/Plan     Quinton Elder Jr. is a 46 y.o.male with:    Pulmonary sarcoidosis  -     Ambulatory referral/consult to Pulmonology; Future; Expected date: 03/21/2022  -     CBC Auto Differential; Future; Expected date: 03/14/2022  -     Comprehensive Metabolic Panel; Future; Expected date:  03/14/2022  Resume oral steroid. Referral to pulmonology for chronic care.    Medication monitoring encounter  -     CBC Auto Differential; Future; Expected date: 03/14/2022  -     Comprehensive Metabolic Panel; Future; Expected date: 03/14/2022    Weight loss  -     T4, Free; Future; Expected date: 03/14/2022  -     TSH; Future; Expected date: 03/14/2022  Expect weight loss due to pulmonary ds/shortness of breath and being off steroid.  Will get labs, and workup further when he follows up for annual in 3 months and pulmonary issues are settled.     Other orders  -     predniSONE (DELTASONE) 10 MG tablet; Take 1 tablet (10 mg total) by mouth once daily.  Dispense: 90 tablet; Refill: 1         Chronic conditions status updated as per HPI.  Other than changes above, cont current medications and maintain follow up with specialists.  Return to clinic in Follow up in about 3 months (around 6/14/2022).      Britney Britton MD  [facility]

## 2022-04-05 ENCOUNTER — OFFICE VISIT (OUTPATIENT)
Dept: PULMONOLOGY | Facility: CLINIC | Age: 47
End: 2022-04-05
Payer: COMMERCIAL

## 2022-04-05 VITALS
OXYGEN SATURATION: 95 % | HEIGHT: 66 IN | SYSTOLIC BLOOD PRESSURE: 115 MMHG | BODY MASS INDEX: 25.97 KG/M2 | HEART RATE: 76 BPM | DIASTOLIC BLOOD PRESSURE: 70 MMHG | WEIGHT: 161.63 LBS

## 2022-04-05 DIAGNOSIS — D86.0 PULMONARY SARCOIDOSIS: ICD-10-CM

## 2022-04-05 DIAGNOSIS — D86.0 PULMONARY SARCOIDOSIS: Primary | ICD-10-CM

## 2022-04-05 PROCEDURE — 3074F PR MOST RECENT SYSTOLIC BLOOD PRESSURE < 130 MM HG: ICD-10-PCS | Mod: CPTII,S$GLB,, | Performed by: NURSE PRACTITIONER

## 2022-04-05 PROCEDURE — 99213 PR OFFICE/OUTPT VISIT, EST, LEVL III, 20-29 MIN: ICD-10-PCS | Mod: S$GLB,,, | Performed by: NURSE PRACTITIONER

## 2022-04-05 PROCEDURE — 3078F DIAST BP <80 MM HG: CPT | Mod: CPTII,S$GLB,, | Performed by: NURSE PRACTITIONER

## 2022-04-05 PROCEDURE — 1160F PR REVIEW ALL MEDS BY PRESCRIBER/CLIN PHARMACIST DOCUMENTED: ICD-10-PCS | Mod: CPTII,S$GLB,, | Performed by: NURSE PRACTITIONER

## 2022-04-05 PROCEDURE — 1159F PR MEDICATION LIST DOCUMENTED IN MEDICAL RECORD: ICD-10-PCS | Mod: CPTII,S$GLB,, | Performed by: NURSE PRACTITIONER

## 2022-04-05 PROCEDURE — 99213 OFFICE O/P EST LOW 20 MIN: CPT | Mod: S$GLB,,, | Performed by: NURSE PRACTITIONER

## 2022-04-05 PROCEDURE — 3078F PR MOST RECENT DIASTOLIC BLOOD PRESSURE < 80 MM HG: ICD-10-PCS | Mod: CPTII,S$GLB,, | Performed by: NURSE PRACTITIONER

## 2022-04-05 PROCEDURE — 99999 PR PBB SHADOW E&M-EST. PATIENT-LVL IV: ICD-10-PCS | Mod: PBBFAC,,, | Performed by: NURSE PRACTITIONER

## 2022-04-05 PROCEDURE — 3074F SYST BP LT 130 MM HG: CPT | Mod: CPTII,S$GLB,, | Performed by: NURSE PRACTITIONER

## 2022-04-05 PROCEDURE — 3008F PR BODY MASS INDEX (BMI) DOCUMENTED: ICD-10-PCS | Mod: CPTII,S$GLB,, | Performed by: NURSE PRACTITIONER

## 2022-04-05 PROCEDURE — 1160F RVW MEDS BY RX/DR IN RCRD: CPT | Mod: CPTII,S$GLB,, | Performed by: NURSE PRACTITIONER

## 2022-04-05 PROCEDURE — 1159F MED LIST DOCD IN RCRD: CPT | Mod: CPTII,S$GLB,, | Performed by: NURSE PRACTITIONER

## 2022-04-05 PROCEDURE — 99999 PR PBB SHADOW E&M-EST. PATIENT-LVL IV: CPT | Mod: PBBFAC,,, | Performed by: NURSE PRACTITIONER

## 2022-04-05 PROCEDURE — 3008F BODY MASS INDEX DOCD: CPT | Mod: CPTII,S$GLB,, | Performed by: NURSE PRACTITIONER

## 2022-04-05 NOTE — PROGRESS NOTES
Subjective:       Patient ID: Quinton Gutierrez Jr. is a 46 y.o. male.    Chief Complaint: Sarcoid   HPI:   Quinton Gutierrez Jr. is a 46 y.o. male who presents with evaluation for worsening shortness of breath  Shortness of breath that slowly started 2 weeks ago.  Last year was diagnosed with sarcoid (EBUS 10/2020).  Had a virtual visit and was sent to the ED. Has seen Dr. De Los Santos in the past but is new to me. Was in hospital 4-5 days.  Was on steroids and had some mood alterations, tapered off.  He was due to follow up but had some insurance changes and was unable.   Has albuterol and he has been using it more laterly, like 5 times daily and getting no relief.  At night he props up but notices that he is striuggling to breath.  Rare night sweats, no fever or chills  He has a chest xray scheduled for Thursday.  Chart review shows several tests pending scheduling  He is accompanied by his family in clinic today.    Interval Hx 4/5/2022-    Mr. Gutierrez is a 47 y/o M presenting to pulmonary clinic for Sarcoid.  Has followed by KRAIG Holloway and Dr. De Los Santos in the past,but new to me.   Last month, patient follow with his primary care provider secondary to increased shortness of breath.  Patient disclose he had been out of his prednisone for few weeks.  Patient was thought to have a sarcoid flare was prescribed prednisone and encouraged to uses Advair inhaler.  Today, patient reports significant improvement in his shortness of breath.  Tells he is on prednisone 10 mg daily.  States he does not use his Advair inhaler secondary to-finding no benefit in use.  On 02/17/2022-review chest x-ray.  Noted stable extensive heavy burden diffuse miliary nodules.   Patient denies fever, chills, hemoptysis, night sweats or weight loss.    Review of Systems   Constitutional: Negative for fever, chills, activity change, fatigue and night sweats.   HENT: Negative for postnasal drip, rhinorrhea, trouble swallowing and congestion.    Eyes: Negative for itching.  "  Respiratory: Positive for shortness of breath (improving) and dyspnea on extertion (Improving). Negative for cough, hemoptysis, sputum production, choking, chest tightness, wheezing and use of rescue inhaler.    Cardiovascular: Negative for chest pain and palpitations.   Genitourinary: Negative for difficulty urinating.   Endocrine: Negative for cold intolerance and heat intolerance.    Musculoskeletal: Negative for arthralgias.   Skin: Negative for rash.   Gastrointestinal: Negative for nausea, vomiting and acid reflux.   Neurological: Negative for dizziness and light-headedness.   Hematological: Negative for adenopathy.   Psychiatric/Behavioral: Positive for sleep disturbance.         Social History     Tobacco Use    Smoking status: Never Smoker    Smokeless tobacco: Never Used   Substance Use Topics    Alcohol use: Yes     Alcohol/week: 2.0 standard drinks     Types: 2 Standard drinks or equivalent per week       Review of patient's allergies indicates:  No Known Allergies  No past medical history on file.  Past Surgical History:   Procedure Laterality Date    ENDOBRONCHIAL ULTRASOUND N/A 10/1/2020    Procedure: ENDOBRONCHIAL ULTRASOUND (EBUS);  Surgeon: Selene De Los Santos MD;  Location: [facility] OR [address] [place] [address];  Service: Pulmonary;  Laterality: N/A;     Current Outpatient Medications on File Prior to Visit   Medication Sig    albuterol (PROVENTIL/VENTOLIN HFA) 90 mcg/actuation inhaler Inhale 2 puffs into the lungs every 6 (six) hours as needed for Wheezing. Rescue    pneumoc 13-yuri conj-dip cr,PF, (PREVNAR 13, PF,) 0.5 mL Syrg Inject into the muscle.    predniSONE (DELTASONE) 10 MG tablet Take 1 tablet (10 mg total) by mouth once daily.     No current facility-administered medications on file prior to visit.       Objective:      Vitals:    04/05/22 1031   BP: 115/70   BP Location: Left arm   Patient Position: Sitting   Pulse: 76   SpO2: 95%   Weight: 73.3 kg (161 lb 9.6 oz)   Height: 5' 6" (1.676 m) "       Physical Exam   Constitutional: He is oriented to person, place, and time. He appears well-developed and well-nourished. No distress.   HENT:   Head: Normocephalic.   Cardiovascular: Normal rate and regular rhythm.   No murmur heard.  Pulmonary/Chest: Normal expansion, symmetric chest wall expansion, effort normal and breath sounds normal. No respiratory distress. He has no decreased breath sounds. He has no wheezes. He has no rhonchi. He has no rales.   Abdominal: Soft. He exhibits no distension. There is no hepatosplenomegaly. There is no abdominal tenderness.   Musculoskeletal:         General: No edema. Normal range of motion.      Cervical back: Normal range of motion and neck supple.   Lymphadenopathy:     He has no cervical adenopathy.   Neurological: He is alert and oriented to person, place, and time. Gait normal.   Skin: Skin is warm and dry. No cyanosis. Nails show no clubbing.   Psychiatric: He has a normal mood and affect.   Vitals reviewed.    Personal Diagnostic Review    PFTs personally reviewed and discussed with patient    Chest x-ray 02/17/2022-  Impression:     Stable extensive heavy burden diffuse miliary nodules.  Differential diagnosis very long but includes infection such as TB, fungal, and healed varicella pneumonia, metastases, sarcoid, silicosis, plus other pneumoconioses, hypersensitivity pneumonitis, Langerhans cell histiocytosis, plus other rare entities.    6 minute walk test 06/28/2021-  06/28/2021---------Distance: 396.24 meters (1300 feet)       O2 Sat % Supplemental Oxygen Heart Rate Blood Pressure Mark Scale   Pre-exercise  (Resting) 98 % Room Air 85 bpm 129/82 mmHg 3   During Exercise 95 % Room Air 82 bpm 152/85 mmHg 2   Post-exercise  (Recovery) 97 % Room Air  74 bpm 133/81 mmHg      Echocardiogram 06/28/2021-  · The left ventricle is normal in size with normal systolic function. The estimated ejection fraction is 60%.  · Normal right ventricular size with normal right  ventricular systolic function.  · Normal left ventricular diastolic function.  · The estimated PA systolic pressure is 24 mmHg.  · Normal central venous pressure (3 mmHg).          Assessment:     Problem List Items Addressed This Visit        Immunology/Multi System    Pulmonary sarcoidosis    Overview     Confirmed 10/2020 via EBUS  No desat on walk, PFTs show restriction and DLCO impairment. Echo did not suggest significant PH   Chest imaging shows continued parenchymal disease.              Current Assessment & Plan     Plan:  -continue prednisone 10 mg daily  -will refer patient to advanced lung clinic for lung sarcoidosis.           Relevant Orders    Ambulatory referral/consult to Advanced Lung Disease        Follow-up with advanced lung disease clinic.    This note is dictated on M*Modal word recognition program.  There are word recognition mistakes that are occasionally missed on review.

## 2022-04-08 ENCOUNTER — TELEPHONE (OUTPATIENT)
Dept: TRANSPLANT | Facility: CLINIC | Age: 47
End: 2022-04-08
Payer: COMMERCIAL

## 2022-04-08 DIAGNOSIS — D86.9 SARCOIDOSIS: Primary | ICD-10-CM

## 2022-04-08 NOTE — TELEPHONE ENCOUNTER
"Received the plan of care for the ALD referral from Dr. Mata.   Called the patient to discuss the plan but received no answer. Left a voice message asking for a return call. Will also send the patient a [employer] portal message.    4/11/22: The patient responded via [employer] over the weekend and requested appointments on 4/12/22. Sent him a message today including the time and locations for each appointment. His significant other responded and agreed with the times for all appointments scheduled for 4/12. Provided our contact information and directions for each appointment location.     ----- Message from Chelo Mata MD sent at 4/8/2022  8:00 AM CDT -----  Regarding: RE: ALD referral  ALD routine    Testing: full PFTs and 6MWT  ----- Message -----  From: Kayla Torres RN  Sent: 4/7/2022   1:13 PM CDT  To: Marysol Coon RN, Chelo Mata MD  Subject: ALD referral                                     Advanced Lung Disease (ALD) Clinic Referral Note    Referral from: Joanna David NP    Lung diagnosis: Sarcoidosis    Age: 46 y.o.    Height/Weight/BMI:  5'6" / 73.3 kg / 26.08 kg/m²    Smoking history: Social History    Tobacco Use      Smoking status: Never Smoker      Smokeless tobacco: Never Used    PFT date: Ordered by FRANCINE David but no scheduled yet  Last results are from 6/28/2021  FVC 2.92 (7.7%)   FEV1 2.58 (78.2)  DLCO 16.22 (52.5)    6 MWT date: 6/28/2021  Distance 1300 feet; no desaturation with activity     CXR date: 02/17/2022  Impression: Again seen are extensive heavy burden miliary nodules.     Chest CT date: 06/29/2021  Impression: Redemonstration of bilateral miliary nodules and mediastinal and bilateral hilar lymphadenopathy.  The nodules are unchanged when compared to 09/27/2020, though the lymphadenopathy is improved.  The patient has undergone bronchoscopy for this that revealed granulomas consistent with sarcoid.    Echo date: 06/28/2021    Impression:  · The left ventricle is " normal in size with normal systolic function. The estimated ejection fraction is 60%.  · Normal right ventricular size with normal right ventricular systolic function.  · Normal left ventricular diastolic function.  · The estimated PA systolic pressure is 24 mmHg.  · Normal central venous pressure (3 mmHg).         Recommendations?

## 2022-04-11 NOTE — ASSESSMENT & PLAN NOTE
Plan:  -continue prednisone 10 mg daily  -will refer patient to advanced lung clinic for lung sarcoidosis.

## 2022-04-12 ENCOUNTER — HOSPITAL ENCOUNTER (OUTPATIENT)
Dept: PULMONOLOGY | Facility: CLINIC | Age: 47
Discharge: HOME | End: 2022-04-12
Payer: COMMERCIAL

## 2022-04-12 ENCOUNTER — OFFICE VISIT (OUTPATIENT)
Dept: TRANSPLANT | Facility: CLINIC | Age: 47
End: 2022-04-12
Payer: COMMERCIAL

## 2022-04-12 VITALS
HEART RATE: 65 BPM | TEMPERATURE: 97 F | OXYGEN SATURATION: 97 % | DIASTOLIC BLOOD PRESSURE: 71 MMHG | RESPIRATION RATE: 18 BRPM | HEIGHT: 68 IN | BODY MASS INDEX: 24.56 KG/M2 | WEIGHT: 162.06 LBS | BODY MASS INDEX: 24.56 KG/M2 | SYSTOLIC BLOOD PRESSURE: 117 MMHG | HEIGHT: 68 IN | WEIGHT: 162.06 LBS

## 2022-04-12 DIAGNOSIS — D86.9 SARCOIDOSIS: ICD-10-CM

## 2022-04-12 DIAGNOSIS — D86.0 PULMONARY SARCOIDOSIS: Primary | ICD-10-CM

## 2022-04-12 DIAGNOSIS — R06.02 SOB (SHORTNESS OF BREATH): ICD-10-CM

## 2022-04-12 PROCEDURE — 94010 BREATHING CAPACITY TEST: ICD-10-PCS | Mod: NTX,S$GLB,, | Performed by: INTERNAL MEDICINE

## 2022-04-12 PROCEDURE — 99214 OFFICE O/P EST MOD 30 MIN: CPT | Mod: 25,NTX,S$GLB, | Performed by: INTERNAL MEDICINE

## 2022-04-12 PROCEDURE — 3078F DIAST BP <80 MM HG: CPT | Mod: CPTII,NTX,S$GLB, | Performed by: INTERNAL MEDICINE

## 2022-04-12 PROCEDURE — 94729 PR C02/MEMBANE DIFFUSE CAPACITY: ICD-10-PCS | Mod: NTX,S$GLB,, | Performed by: INTERNAL MEDICINE

## 2022-04-12 PROCEDURE — 94727 GAS DIL/WSHOT DETER LNG VOL: CPT | Mod: NTX,S$GLB,, | Performed by: INTERNAL MEDICINE

## 2022-04-12 PROCEDURE — 99999 PR PBB SHADOW E&M-EST. PATIENT-LVL IV: CPT | Mod: PBBFAC,TXP,, | Performed by: INTERNAL MEDICINE

## 2022-04-12 PROCEDURE — 99214 PR OFFICE/OUTPT VISIT, EST, LEVL IV, 30-39 MIN: ICD-10-PCS | Mod: 25,NTX,S$GLB, | Performed by: INTERNAL MEDICINE

## 2022-04-12 PROCEDURE — 3008F PR BODY MASS INDEX (BMI) DOCUMENTED: ICD-10-PCS | Mod: CPTII,NTX,S$GLB, | Performed by: INTERNAL MEDICINE

## 2022-04-12 PROCEDURE — 94618 PULMONARY STRESS TESTING: CPT | Mod: NTX,S$GLB,, | Performed by: INTERNAL MEDICINE

## 2022-04-12 PROCEDURE — 1159F PR MEDICATION LIST DOCUMENTED IN MEDICAL RECORD: ICD-10-PCS | Mod: CPTII,NTX,S$GLB, | Performed by: INTERNAL MEDICINE

## 2022-04-12 PROCEDURE — 3078F PR MOST RECENT DIASTOLIC BLOOD PRESSURE < 80 MM HG: ICD-10-PCS | Mod: CPTII,NTX,S$GLB, | Performed by: INTERNAL MEDICINE

## 2022-04-12 PROCEDURE — 1159F MED LIST DOCD IN RCRD: CPT | Mod: CPTII,NTX,S$GLB, | Performed by: INTERNAL MEDICINE

## 2022-04-12 PROCEDURE — 3074F SYST BP LT 130 MM HG: CPT | Mod: CPTII,NTX,S$GLB, | Performed by: INTERNAL MEDICINE

## 2022-04-12 PROCEDURE — 3008F BODY MASS INDEX DOCD: CPT | Mod: CPTII,NTX,S$GLB, | Performed by: INTERNAL MEDICINE

## 2022-04-12 PROCEDURE — 3074F PR MOST RECENT SYSTOLIC BLOOD PRESSURE < 130 MM HG: ICD-10-PCS | Mod: CPTII,NTX,S$GLB, | Performed by: INTERNAL MEDICINE

## 2022-04-12 PROCEDURE — 94010 BREATHING CAPACITY TEST: CPT | Mod: NTX,S$GLB,, | Performed by: INTERNAL MEDICINE

## 2022-04-12 PROCEDURE — 94729 DIFFUSING CAPACITY: CPT | Mod: NTX,S$GLB,, | Performed by: INTERNAL MEDICINE

## 2022-04-12 PROCEDURE — 99999 PR PBB SHADOW E&M-EST. PATIENT-LVL IV: ICD-10-PCS | Mod: PBBFAC,TXP,, | Performed by: INTERNAL MEDICINE

## 2022-04-12 PROCEDURE — 94727 PR PULM FUNCTION TEST BY GAS: ICD-10-PCS | Mod: NTX,S$GLB,, | Performed by: INTERNAL MEDICINE

## 2022-04-12 PROCEDURE — 94618 PULMONARY STRESS TESTING: ICD-10-PCS | Mod: NTX,S$GLB,, | Performed by: INTERNAL MEDICINE

## 2022-04-12 NOTE — LETTER
April 12, 2022        Joanna Mendez  [address]  [place] [address]  Phone: [phone]  Fax: [phone]             [facility]  [address]  [facility] [address]  Phone: [phone]   Patient: Quinton Gutierrez Jr.   MR Number: [id number]   YOB: 1975   Date of Visit: 4/12/2022       Dear Dr. Joanna Mendez    Thank you for referring Quinton Gutierrez to me for evaluation. Attached you will find relevant portions of my assessment and plan of care.    If you have questions, please do not hesitate to call me. I look forward to following Quinton Gutierrez along with you.    Sincerely,    Chelo Mata MD    Enclosure    If you would like to receive this communication electronically, please contact [email or web address] or [phone] to request [facility] access.    [facility] is a tool which provides read-only access to select patient information with whom you have a relationship. Its easy to use and provides real time access to review your patients record including encounter summaries, notes, results, and demographic information.    If you feel you have received this communication in error or would no longer like to receive these types of communications, please e-mail [email or web address]

## 2022-04-12 NOTE — PROGRESS NOTES
ADVANCED LUNG DISEASE INITIAL EVALUATION                                                                                                                                           Reason for Visit:  Evaluation for lung transplant    Referring Physician: Joanna Mendez NP    History of Present Illness: Quinton Gutierrez Jr. is a 46 y.o. male who is on 0L of oxygen.  He is on no assisted ventilation.  His New [place] Heart Association Class is IV and a Karnofsky score of 90% - Able to carry on normal activity: minor symptoms of disease. He is not diabetic.     He was seen last week in general pulmonary by Joanna Mendez NP.  Previously he was diagnosed with sarcoid (EBUS 10/2020).  Had seen Dr. De Los Santos in the past. Was in hospital 4-5 days in 2020 after diagnosis.  Was previously on high dose steroids and had some mood alterations, and was tapered off.  He was due to follow up but had some insurance changes and was unable.  Today he is on 10 mg prednisone and has no complaints of shortness of breath, with the exception of extreme exertion.  He works full time for [employer] and has no issues performing his work duties.  Has no known exposures.  Never smoked. His PFT's have improved since being on daily prednisone.  No fever or chills, no sicknesses, no cough, and no hospital or ED visits.  Chest CT (10/2020) and CXR (4/2022) with extensive miliary nodules.  6MWT was good with no significant desaturations, however his post-exercise heart rate dropped. Denies every seeing rheumatology or cardiology.        Past Medical History:   Diagnosis Date    Sarcoidosis, unspecified        Past Surgical History:   Procedure Laterality Date    ENDOBRONCHIAL ULTRASOUND N/A 10/1/2020    Procedure: ENDOBRONCHIAL ULTRASOUND (EBUS);  Surgeon: Selene De Los Santos MD;  Location: [facility] OR [address] [place] [address];  Service: Pulmonary;  Laterality: N/A;       Allergies: Patient has no known allergies.    Current Outpatient Medications   Medication Sig     predniSONE (DELTASONE) 10 MG tablet Take 1 tablet (10 mg total) by mouth once daily.    pneumoc 13-yuri conj-dip cr,PF, (PREVNAR 13, PF,) 0.5 mL Syrg Inject into the muscle. (Patient not taking: Reported on 4/12/2022)     No current facility-administered medications for this visit.       Immunization History   Administered Date(s) Administered    COVID-19, MRNA, LN-S, PF (MODERNA FULL 0.5 ML DOSE) 03/25/2021, 04/21/2021    Pneumococcal Conjugate - 13 Valent 03/14/2022     Family History:    Family History   Problem Relation Age of Onset    Colon cancer Neg Hx     Breast cancer Neg Hx     Prostate cancer Neg Hx     Heart attacks under age 50 Neg Hx      Social History     Substance and Sexual Activity   Alcohol Use Yes    Alcohol/week: 2.0 standard drinks    Types: 2 Standard drinks or equivalent per week      Social History     Substance and Sexual Activity   Drug Use Never      Social History     Socioeconomic History    Marital status:    Tobacco Use    Smoking status: Never Smoker    Smokeless tobacco: Never Used   Substance and Sexual Activity    Alcohol use: Yes     Alcohol/week: 2.0 standard drinks     Types: 2 Standard drinks or equivalent per week    Drug use: Never     Review of Systems   Constitutional: Negative.  Negative for chills, diaphoresis, fever, malaise/fatigue and weight loss.   HENT: Negative.  Negative for congestion, hearing loss and sore throat.    Eyes: Negative.    Respiratory: Positive for shortness of breath (with extreme exertion). Negative for cough, hemoptysis, sputum production and wheezing.    Cardiovascular: Negative.  Negative for chest pain, palpitations, orthopnea, claudication, leg swelling and PND.   Gastrointestinal: Negative.    Genitourinary: Negative.    Musculoskeletal: Negative.    Skin: Negative.  Negative for itching and rash.   Neurological: Negative.    Endo/Heme/Allergies: Negative.    Psychiatric/Behavioral: Negative.      Vitals  /71    "Pulse 65   Temp 96.6 °F (35.9 °C)   Resp 18   Ht 5' 8" (1.727 m)   Wt 73.5 kg (162 lb 0.6 oz)   SpO2 97%   BMI 24.64 kg/m²   Physical Exam  Constitutional:       Appearance: Normal appearance. He is normal weight.   HENT:      Head: Normocephalic and atraumatic.   Eyes:      Extraocular Movements: Extraocular movements intact.      Conjunctiva/sclera: Conjunctivae normal.   Cardiovascular:      Rate and Rhythm: Normal rate and regular rhythm.   Pulmonary:      Effort: Pulmonary effort is normal. No respiratory distress.      Breath sounds: Normal breath sounds. No stridor. No wheezing, rhonchi or rales.   Chest:      Chest wall: No tenderness.   Abdominal:      Palpations: Abdomen is soft.   Musculoskeletal:         General: No swelling. Normal range of motion.      Cervical back: Normal range of motion.      Right lower leg: No edema.      Left lower leg: No edema.   Skin:     General: Skin is warm and dry.      Capillary Refill: Capillary refill takes less than 2 seconds.   Neurological:      General: No focal deficit present.      Mental Status: He is alert and oriented to person, place, and time.   Psychiatric:         Mood and Affect: Mood normal.         Behavior: Behavior normal.         Thought Content: Thought content normal.         Judgment: Judgment normal.         Labs:  No visits with results within 7 Day(s) from this visit.   Latest known visit with results is:   Lab Visit on 03/14/2022   Component Date Value    WBC 03/14/2022 6.23     RBC 03/14/2022 5.09     Hemoglobin 03/14/2022 13.6 (A)    Hematocrit 03/14/2022 44.0     MCV 03/14/2022 86     MCH 03/14/2022 26.7 (A)    MCHC 03/14/2022 30.9 (A)    RDW 03/14/2022 12.8     Platelets 03/14/2022 302     MPV 03/14/2022 10.0     Immature Granulocytes 03/14/2022 0.3     Gran # (ANC) 03/14/2022 4.5     Immature Grans (Abs) 03/14/2022 0.02     Lymph # 03/14/2022 0.8 (A)    Mono # 03/14/2022 0.7     Eos # 03/14/2022 0.3     Baso # " 03/14/2022 0.03     nRBC 03/14/2022 0     Gran % 03/14/2022 71.5     Lymph % 03/14/2022 12.5 (A)    Mono % 03/14/2022 10.9     Eosinophil % 03/14/2022 4.3     Basophil % 03/14/2022 0.5     Differential Method 03/14/2022 Automated     Sodium 03/14/2022 140     Potassium 03/14/2022 3.7     Chloride 03/14/2022 105     CO2 03/14/2022 26     Glucose 03/14/2022 92     BUN 03/14/2022 12     Creatinine 03/14/2022 0.9     Calcium 03/14/2022 9.7     Total Protein 03/14/2022 6.9     Albumin 03/14/2022 3.4 (A)    Total Bilirubin 03/14/2022 0.6     Alkaline Phosphatase 03/14/2022 76     AST 03/14/2022 19     ALT 03/14/2022 24     Anion Gap 03/14/2022 9     eGFR if African American 03/14/2022 >60.0     eGFR if non African Amer* 03/14/2022 >60.0     Free T4 03/14/2022 1.00     TSH 03/14/2022 1.469        Pulmonary Function Tests 4/12/2022 6/28/2021   FVC 3.39 2.92   FEV1 3.08 2.58   TLC (liters) 4.15 -   DLCO (ml/mmHg sec) 15.53 16.22   FVC% 84 71   FEV1% 94 78   FEF 25-75 5.81 4.3   FEF 25-75% 181 133   TLC% 62 -   RV 0.76 -   RV% 38 -   DLCO% 51 53     6MW 4/12/2022 6/28/2021   6MWT Status completed without stopping completed without stopping   Patient Reported No complaints Lightheadedness;Dizziness   Was O2 used? No No   6MW Distance walked (feet) 1500 1300   Distance walked (meters) 457.2 396.24   Did patient stop? No No   Oxygen Saturation 97 98   Supplemental Oxygen Room Air Room Air   Heart Rate 66 85   Blood Pressure 109/75 129/82   Mark Dyspnea Rating  nothing at all moderate   Oxygen Saturation 90 95   Supplemental Oxygen Room Air Room Air   Heart Rate 56 82   Blood Pressure 133/80 152/85   Mark Dyspnea Rating  nothing at all light   Recovery Time (seconds) 128 96   Oxygen Saturation 98 97   Supplemental Oxygen Room Air Room Air   Heart Rate 55 74       Imaging:  Results for orders placed during the hospital encounter of 02/17/22    X-Ray Chest PA And Lateral    Narrative  EXAMINATION:  XR  CHEST PA AND LATERAL    CLINICAL HISTORY:  Shortness of breath    FINDINGS:  Chest two views: Again seen are extensive heavy burden miliary nodules.  Heart size is normal.  Bones show no abnormality.    Impression  Stable extensive heavy burden diffuse miliary nodules.  Differential diagnosis very long but includes infection such as TB, fungal, and healed varicella pneumonia, metastases, sarcoid, silicosis, plus other pneumoconioses, hypersensitivity pneumonitis, Langerhans cell histiocytosis, plus other rare entities.      Electronically signed by: Renny Terry MD  Date:    02/17/2022  Time:    08:55        Cardiodiagnostics:    Results for orders placed during the hospital encounter of 06/28/21    Echo Color Flow Doppler? Yes    Interpretation Summary  · The left ventricle is normal in size with normal systolic function. The estimated ejection fraction is 60%.  · Normal right ventricular size with normal right ventricular systolic function.  · Normal left ventricular diastolic function.  · The estimated PA systolic pressure is 24 mmHg.  · Normal central venous pressure (3 mmHg).      Assessment:  1. Pulmonary sarcoidosis    2. SOB (shortness of breath)      Plan: Diagnosed via EBUS in 2020.    Chest CT from 10/2020 and CXR from 4/2022 both with heavy amount of diffuse miliary nodules.  Patient with improvement of spirometry from last June; stable on 10 mg prednisone daily.  No desaturations on 6MWT, but heart rate decreased from 66 pre-walk to 56 post-walk.  He has never seen rheumatology nor cardiology.  Works full time with no issues.  Overall, he is doing well.   Will have patient see cardiology to rule out cardiac conduction issues in the setting of Pulmonary Sarcoidosis.  Will place outpatient referral to rheumatology to established care.    RTC in 6 months with PFTs and 6MWT    Damaso Sylvester NP  Advanced Lung Disease

## 2022-04-13 NOTE — PROCEDURES
Quinton Gutierrez Jr. is a 46 y.o.  male patient, who presents for a 6 minute walk test ordered by MD Adolfo.  The diagnosis is Sarcoidosis.  The patient's BMI is 24.6 kg/m2.  Predicted distance (lower limit of normal) is 543.63 meters.      Test Results:    The test was completed without stopping.    The total time walked was 360 seconds.  During walking, the patient reported:  No complaints. The patient used no assistive devices  during testing.     4/12/2022--------Distance: 457.2 meters (1500 feet)     O2 Sat % Supplemental Oxygen Heart Rate Blood Pressure Mark Scale   Pre-exercise  (Resting) 97 % Room Air 66 bpm 109/75 mmHg 0   During Exercise 90 % Room Air 56 bpm 133/80 mmHg 0   Post-exercise  (Recovery) 98 % Room Air  55 bpm 118/68 mmHg       Recovery Time: 128 seconds    Performing nurse/tech: Ciarra BROWN      PREVIOUS STUDY:   The patient had a previous study.    06/28/2021---------Distance: 396.24 meters (1300 feet)       O2 Sat % Supplemental Oxygen Heart Rate Blood Pressure Mark Scale   Pre-exercise  (Resting) 98 % Room Air 85 bpm 129/82 mmHg 3   During Exercise 95 % Room Air 82 bpm 152/85 mmHg 2   Post-exercise  (Recovery) 97 % Room Air  74 bpm 133/81 mmHg           CLINICAL INTERPRETATION:  Six minute walk distance is 457.2 meters (1500 feet) with no dyspnea.  During exercise, there was significant desaturation while breathing room air.  Both blood pressure and heart rate remained stable with walking.  The patient did not report non-pulmonary symptoms during exercise.  Since the previous study in June 2021, exercise capacity is unchanged.  Based upon age and body mass index, exercise capacity is less than predicted.

## 2022-04-19 ENCOUNTER — PATIENT MESSAGE (OUTPATIENT)
Dept: TRANSPLANT | Facility: CLINIC | Age: 47
End: 2022-04-19
Payer: COMMERCIAL

## 2022-04-29 ENCOUNTER — PATIENT OUTREACH (OUTPATIENT)
Dept: ADMINISTRATIVE | Facility: OTHER | Age: 47
End: 2022-04-29
Payer: COMMERCIAL

## 2022-04-29 NOTE — PROGRESS NOTES
LINKS immunization registry updated  Care Everywhere updated  Health Maintenance updated  Chart reviewed for overdue Proactive [facility] Encounters (STACY) health maintenance testing (CRS, Breast Ca, Diabetic Eye Exam)   Orders entered:N/A

## 2022-05-04 ENCOUNTER — OFFICE VISIT (OUTPATIENT)
Dept: CARDIOLOGY | Facility: CLINIC | Age: 47
End: 2022-05-04
Payer: COMMERCIAL

## 2022-05-04 VITALS
HEIGHT: 68 IN | SYSTOLIC BLOOD PRESSURE: 106 MMHG | HEART RATE: 66 BPM | WEIGHT: 162.81 LBS | OXYGEN SATURATION: 95 % | DIASTOLIC BLOOD PRESSURE: 65 MMHG | BODY MASS INDEX: 24.68 KG/M2

## 2022-05-04 DIAGNOSIS — R94.31 ABNORMAL EKG: Primary | ICD-10-CM

## 2022-05-04 DIAGNOSIS — D86.0 PULMONARY SARCOIDOSIS: ICD-10-CM

## 2022-05-04 PROCEDURE — 99213 PR OFFICE/OUTPT VISIT, EST, LEVL III, 20-29 MIN: ICD-10-PCS | Mod: NTX,S$GLB,, | Performed by: NURSE PRACTITIONER

## 2022-05-04 PROCEDURE — 1159F PR MEDICATION LIST DOCUMENTED IN MEDICAL RECORD: ICD-10-PCS | Mod: CPTII,NTX,S$GLB, | Performed by: NURSE PRACTITIONER

## 2022-05-04 PROCEDURE — 3008F PR BODY MASS INDEX (BMI) DOCUMENTED: ICD-10-PCS | Mod: CPTII,NTX,S$GLB, | Performed by: NURSE PRACTITIONER

## 2022-05-04 PROCEDURE — 1159F MED LIST DOCD IN RCRD: CPT | Mod: CPTII,NTX,S$GLB, | Performed by: NURSE PRACTITIONER

## 2022-05-04 PROCEDURE — 3008F BODY MASS INDEX DOCD: CPT | Mod: CPTII,NTX,S$GLB, | Performed by: NURSE PRACTITIONER

## 2022-05-04 PROCEDURE — 1160F PR REVIEW ALL MEDS BY PRESCRIBER/CLIN PHARMACIST DOCUMENTED: ICD-10-PCS | Mod: CPTII,NTX,S$GLB, | Performed by: NURSE PRACTITIONER

## 2022-05-04 PROCEDURE — 3074F SYST BP LT 130 MM HG: CPT | Mod: CPTII,NTX,S$GLB, | Performed by: NURSE PRACTITIONER

## 2022-05-04 PROCEDURE — 99999 PR PBB SHADOW E&M-EST. PATIENT-LVL IV: CPT | Mod: PBBFAC,TXP,, | Performed by: NURSE PRACTITIONER

## 2022-05-04 PROCEDURE — 99213 OFFICE O/P EST LOW 20 MIN: CPT | Mod: NTX,S$GLB,, | Performed by: NURSE PRACTITIONER

## 2022-05-04 PROCEDURE — 3078F DIAST BP <80 MM HG: CPT | Mod: CPTII,NTX,S$GLB, | Performed by: NURSE PRACTITIONER

## 2022-05-04 PROCEDURE — 3078F PR MOST RECENT DIASTOLIC BLOOD PRESSURE < 80 MM HG: ICD-10-PCS | Mod: CPTII,NTX,S$GLB, | Performed by: NURSE PRACTITIONER

## 2022-05-04 PROCEDURE — 99999 PR PBB SHADOW E&M-EST. PATIENT-LVL IV: ICD-10-PCS | Mod: PBBFAC,TXP,, | Performed by: NURSE PRACTITIONER

## 2022-05-04 PROCEDURE — 3074F PR MOST RECENT SYSTOLIC BLOOD PRESSURE < 130 MM HG: ICD-10-PCS | Mod: CPTII,NTX,S$GLB, | Performed by: NURSE PRACTITIONER

## 2022-05-04 PROCEDURE — 1160F RVW MEDS BY RX/DR IN RCRD: CPT | Mod: CPTII,NTX,S$GLB, | Performed by: NURSE PRACTITIONER

## 2022-05-04 NOTE — PROGRESS NOTES
Cardiology    5/4/2022  9:26 AM    Problem list  Patient Active Problem List   Diagnosis    SOB (shortness of breath)    Bilateral Lung infiltrate on CT    Mediastinal lymphadenopathy    Abnormal CT of the chest    Pulmonary sarcoidosis    Abnormal EKG    Vitamin D deficiency       CC:  Cardiac sarcoidosis    HPI:  No PMH heart disease  No family history  No tobacco  ETOH on occasion  No caffeine  He is symptomatic if he is off of the prednisone but overall does well.      Medications  Current Outpatient Medications   Medication Sig Dispense Refill    predniSONE (DELTASONE) 10 MG tablet Take 1 tablet (10 mg total) by mouth once daily. 90 tablet 1    pneumoc 13-yuri conj-dip cr,PF, (PREVNAR 13, PF,) 0.5 mL Syrg Inject into the muscle. (Patient not taking: Reported on 4/12/2022) 0.5 mL 0     No current facility-administered medications for this visit.      Prior to Admission medications    Medication Sig Start Date End Date Taking? Authorizing Provider   predniSONE (DELTASONE) 10 MG tablet Take 1 tablet (10 mg total) by mouth once daily. 3/14/22  Yes Britney Britton MD   pneumoc 13-yuri conj-dip cr,PF, (PREVNAR 13, PF,) 0.5 mL Syrg Inject into the muscle.  Patient not taking: Reported on 4/12/2022 3/14/22   Ayse Gilliam, PharmD         History  Past Medical History:   Diagnosis Date    Sarcoidosis, unspecified      Past Surgical History:   Procedure Laterality Date    ENDOBRONCHIAL ULTRASOUND N/A 10/1/2020    Procedure: ENDOBRONCHIAL ULTRASOUND (EBUS);  Surgeon: Selene De Los Santos MD;  Location: [facility] OR [address] [place] [address];  Service: Pulmonary;  Laterality: N/A;     Social History     Socioeconomic History    Marital status:    Tobacco Use    Smoking status: Never Smoker    Smokeless tobacco: Never Used   Substance and Sexual Activity    Alcohol use: Yes     Alcohol/week: 2.0 standard drinks     Types: 2 Standard drinks or equivalent per week    Drug use: Never         Allergies  Review of patient's  allergies indicates:  No Known Allergies      Review of Systems   Review of Systems   Constitutional: Negative for diaphoresis and malaise/fatigue.   HENT: Negative.    Cardiovascular: Positive for dyspnea on exertion and paroxysmal nocturnal dyspnea (when not on prednisone). Negative for chest pain, claudication, irregular heartbeat, leg swelling, near-syncope, orthopnea, palpitations and syncope.   Respiratory: Negative for shortness of breath.    Endocrine: Negative for polydipsia, polyphagia and polyuria.   Hematologic/Lymphatic: Does not bruise/bleed easily.   Gastrointestinal: Negative for bloating, nausea and vomiting.   Genitourinary: Negative.    Neurological: Positive for dizziness and light-headedness. Negative for excessive daytime sleepiness, loss of balance and weakness.   Psychiatric/Behavioral: The patient is not nervous/anxious.    Allergic/Immunologic: Negative.          Physical Exam  Wt Readings from Last 1 Encounters:   05/04/22 73.9 kg (162 lb 13 oz)     BP Readings from Last 3 Encounters:   05/04/22 106/65   04/12/22 117/71   04/05/22 115/70     Pulse Readings from Last 1 Encounters:   05/04/22 66     Body mass index is 24.76 kg/m².    Physical Exam  Vitals and nursing note reviewed.   Constitutional:       Appearance: Normal appearance.   HENT:      Head: Normocephalic and atraumatic.      Mouth/Throat:      Mouth: Mucous membranes are moist.   Eyes:      Pupils: Pupils are equal, round, and reactive to light.   Cardiovascular:      Rate and Rhythm: Normal rate and regular rhythm.      Pulses:           Radial pulses are 2+ on the right side and 2+ on the left side.        Dorsalis pedis pulses are 2+ on the right side and 2+ on the left side.        Posterior tibial pulses are 2+ on the right side and 2+ on the left side.      Heart sounds: No murmur heard.  Pulmonary:      Effort: Pulmonary effort is normal. No respiratory distress.      Comments: Diminished breath sounds in  bases  Abdominal:      General: There is no distension.      Tenderness: There is no abdominal tenderness.   Musculoskeletal:      Cervical back: Normal range of motion.      Right lower leg: No edema.      Left lower leg: No edema.   Skin:     General: Skin is warm and dry.      Findings: No erythema.   Neurological:      General: No focal deficit present.      Mental Status: He is alert.   Psychiatric:         Mood and Affect: Mood normal.         Behavior: Behavior normal.       Problem List Items Addressed This Visit        Pulmonary    Pulmonary sarcoidosis    Overview     Confirmed 10/2020 via EBUS  No desat on walk, PFTs show restriction and DLCO impairment. Echo did not suggest significant PH   Chest imaging shows continued parenchymal disease.              Relevant Orders    Holter monitor - 48 hour    Echo       Cardiac/Vascular    Abnormal EKG - Primary    Overview     Previous sinus with PVCs  Now sinus elina  He is sent to us for evaluation of potential cardiac sarcoid  Await echo and Holter  Encourage him to follow up with lung transplant  RTC 4 weeks           Relevant Orders    IN OFFICE EKG 12-LEAD (to [facility])                        Follow Up    4 weeks    @Nany Holloway DNP

## 2022-09-01 ENCOUNTER — PATIENT MESSAGE (OUTPATIENT)
Dept: TRANSPLANT | Facility: CLINIC | Age: 47
End: 2022-09-01
Payer: COMMERCIAL

## 2022-09-26 ENCOUNTER — TELEPHONE (OUTPATIENT)
Dept: TRANSPLANT | Facility: CLINIC | Age: 47
End: 2022-09-26
Payer: COMMERCIAL

## 2022-09-27 DIAGNOSIS — D86.0 PULMONARY SARCOIDOSIS: Primary | ICD-10-CM

## 2022-10-14 NOTE — TELEPHONE ENCOUNTER
No new care gaps identified.  [facility] Embedded Care Gaps. Reference number: [id number]. 10/14/2022   9:16:21 AM CDT

## 2022-10-17 RX ORDER — PREDNISONE 10 MG/1
TABLET ORAL
Qty: 90 TABLET | Refills: 0 | Status: SHIPPED | OUTPATIENT
Start: 2022-10-17

## 2022-10-17 NOTE — TELEPHONE ENCOUNTER
I refilled the prednisone for 3 months. Can pulmonology manage this medication since I have moved?

## 2022-10-20 ENCOUNTER — HOSPITAL ENCOUNTER (OUTPATIENT)
Dept: PULMONOLOGY | Facility: CLINIC | Age: 47
Discharge: HOME | End: 2022-10-20
Payer: COMMERCIAL

## 2022-10-20 ENCOUNTER — TELEPHONE (OUTPATIENT)
Dept: TRANSPLANT | Facility: CLINIC | Age: 47
End: 2022-10-20
Payer: COMMERCIAL

## 2022-10-20 ENCOUNTER — OFFICE VISIT (OUTPATIENT)
Dept: TRANSPLANT | Facility: CLINIC | Age: 47
End: 2022-10-20
Payer: COMMERCIAL

## 2022-10-20 VITALS
HEIGHT: 68 IN | BODY MASS INDEX: 25.46 KG/M2 | TEMPERATURE: 97 F | WEIGHT: 168 LBS | OXYGEN SATURATION: 97 % | HEART RATE: 61 BPM | SYSTOLIC BLOOD PRESSURE: 115 MMHG | DIASTOLIC BLOOD PRESSURE: 63 MMHG

## 2022-10-20 VITALS — WEIGHT: 159.38 LBS | HEIGHT: 68 IN | BODY MASS INDEX: 24.15 KG/M2

## 2022-10-20 DIAGNOSIS — D86.0 PULMONARY SARCOIDOSIS: ICD-10-CM

## 2022-10-20 DIAGNOSIS — D86.0 PULMONARY SARCOIDOSIS: Primary | ICD-10-CM

## 2022-10-20 PROCEDURE — 94010 BREATHING CAPACITY TEST: CPT | Mod: NTX,S$GLB,, | Performed by: INTERNAL MEDICINE

## 2022-10-20 PROCEDURE — 3074F SYST BP LT 130 MM HG: CPT | Mod: CPTII,NTX,S$GLB, | Performed by: INTERNAL MEDICINE

## 2022-10-20 PROCEDURE — 94729 PR C02/MEMBANE DIFFUSE CAPACITY: ICD-10-PCS | Mod: NTX,S$GLB,, | Performed by: INTERNAL MEDICINE

## 2022-10-20 PROCEDURE — 94010 BREATHING CAPACITY TEST: ICD-10-PCS | Mod: NTX,S$GLB,, | Performed by: INTERNAL MEDICINE

## 2022-10-20 PROCEDURE — 3078F DIAST BP <80 MM HG: CPT | Mod: CPTII,NTX,S$GLB, | Performed by: INTERNAL MEDICINE

## 2022-10-20 PROCEDURE — 94729 DIFFUSING CAPACITY: CPT | Mod: NTX,S$GLB,, | Performed by: INTERNAL MEDICINE

## 2022-10-20 PROCEDURE — 1159F MED LIST DOCD IN RCRD: CPT | Mod: CPTII,NTX,S$GLB, | Performed by: INTERNAL MEDICINE

## 2022-10-20 PROCEDURE — 94727 PR PULM FUNCTION TEST BY GAS: ICD-10-PCS | Mod: NTX,S$GLB,, | Performed by: INTERNAL MEDICINE

## 2022-10-20 PROCEDURE — 99999 PR PBB SHADOW E&M-EST. PATIENT-LVL III: ICD-10-PCS | Mod: PBBFAC,TXP,, | Performed by: INTERNAL MEDICINE

## 2022-10-20 PROCEDURE — 94727 GAS DIL/WSHOT DETER LNG VOL: CPT | Mod: NTX,S$GLB,, | Performed by: INTERNAL MEDICINE

## 2022-10-20 PROCEDURE — 99214 PR OFFICE/OUTPT VISIT, EST, LEVL IV, 30-39 MIN: ICD-10-PCS | Mod: 25,NTX,S$GLB, | Performed by: INTERNAL MEDICINE

## 2022-10-20 PROCEDURE — 3074F PR MOST RECENT SYSTOLIC BLOOD PRESSURE < 130 MM HG: ICD-10-PCS | Mod: CPTII,NTX,S$GLB, | Performed by: INTERNAL MEDICINE

## 2022-10-20 PROCEDURE — 94618 PULMONARY STRESS TESTING: ICD-10-PCS | Mod: NTX,S$GLB,, | Performed by: INTERNAL MEDICINE

## 2022-10-20 PROCEDURE — 1159F PR MEDICATION LIST DOCUMENTED IN MEDICAL RECORD: ICD-10-PCS | Mod: CPTII,NTX,S$GLB, | Performed by: INTERNAL MEDICINE

## 2022-10-20 PROCEDURE — 3078F PR MOST RECENT DIASTOLIC BLOOD PRESSURE < 80 MM HG: ICD-10-PCS | Mod: CPTII,NTX,S$GLB, | Performed by: INTERNAL MEDICINE

## 2022-10-20 PROCEDURE — 1160F RVW MEDS BY RX/DR IN RCRD: CPT | Mod: CPTII,NTX,S$GLB, | Performed by: INTERNAL MEDICINE

## 2022-10-20 PROCEDURE — 1160F PR REVIEW ALL MEDS BY PRESCRIBER/CLIN PHARMACIST DOCUMENTED: ICD-10-PCS | Mod: CPTII,NTX,S$GLB, | Performed by: INTERNAL MEDICINE

## 2022-10-20 PROCEDURE — 99999 PR PBB SHADOW E&M-EST. PATIENT-LVL III: CPT | Mod: PBBFAC,TXP,, | Performed by: INTERNAL MEDICINE

## 2022-10-20 PROCEDURE — 94618 PULMONARY STRESS TESTING: CPT | Mod: NTX,S$GLB,, | Performed by: INTERNAL MEDICINE

## 2022-10-20 PROCEDURE — 99214 OFFICE O/P EST MOD 30 MIN: CPT | Mod: 25,NTX,S$GLB, | Performed by: INTERNAL MEDICINE

## 2022-10-20 NOTE — PROGRESS NOTES
ADVANCED LUNG DISEASE INITIAL EVALUATION                                                                                                                                           Reason for Visit:  Evaluation for lung transplant    Referring Physician: Joanna Mendez NP    History of Present Illness: Quinton Gutierrez Jr. is a 47 y.o. male who is on 0L of oxygen.  He is on no assisted ventilation.  His New York Heart Association Class is IV and a Karnofsky score of 100% - Normal, No Complaints, no evidence of disease. He is not diabetic.     History:  He was diagnosed with sarcoid (EBUS 10/2020).  Had seen Dr. De Los Santos in the past. Was in hospital 4-5 days in 2020 after diagnosis.  Was previously on high dose steroids and had some mood alterations, and was tapered off.  He was due to follow up but had some insurance changes and was unable.    He was seen in April and was on prednisone 10 mg daily at that time.  He reports that he weaned himself off completely in July.  He reports no shortness of breath, even with exertion.  He denies cough or any other symptoms. He works full time for [employer] and has no issues performing his work duties.  Has no known exposures.  Never smoked. His PFT's have improved since being on daily prednisone.  No fever or chills, no sicknesses, no cough, and no hospital or ED visits.  Chest CT (10/2020) and CXR (4/2022) with extensive miliary nodules.  6MWT good today with no significant desaturations, his post-exercise heart rate is stable.  Saw cardiology in May and was unable to afford the co-pay for and ECHO and holter monitor.           Past Medical History:   Diagnosis Date    Sarcoidosis, unspecified        Past Surgical History:   Procedure Laterality Date    ENDOBRONCHIAL ULTRASOUND N/A 10/1/2020    Procedure: ENDOBRONCHIAL ULTRASOUND (EBUS);  Surgeon: Selene De Los Santos MD;  Location: [facility] OR [address] [place] [address];  Service: Pulmonary;  Laterality: N/A;       Allergies: Patient has no known  allergies.    Current Outpatient Medications   Medication Sig    predniSONE (DELTASONE) 10 MG tablet TAKE 1 TABLET(10 MG) BY MOUTH EVERY DAY (Patient taking differently: Take 10 mg by mouth daily as needed. Takes as needed)    pneumoc 13-yuri conj-dip cr,PF, (PREVNAR 13, PF,) 0.5 mL Syrg Inject into the muscle. (Patient not taking: No sig reported)     No current facility-administered medications for this visit.       Immunization History   Administered Date(s) Administered    COVID-19, MRNA, LN-S, PF (MODERNA FULL 0.5 ML DOSE) 03/25/2021, 04/21/2021    Pneumococcal Conjugate - 13 Valent 03/14/2022     Family History:    Family History   Problem Relation Age of Onset    Colon cancer Neg Hx     Breast cancer Neg Hx     Prostate cancer Neg Hx     Heart attacks under age 50 Neg Hx      Social History     Substance and Sexual Activity   Alcohol Use Yes    Alcohol/week: 2.0 standard drinks    Types: 2 Standard drinks or equivalent per week      Social History     Substance and Sexual Activity   Drug Use Never      Social History     Socioeconomic History    Marital status:    Tobacco Use    Smoking status: Never     Passive exposure: Past    Smokeless tobacco: Never   Substance and Sexual Activity    Alcohol use: Yes     Alcohol/week: 2.0 standard drinks     Types: 2 Standard drinks or equivalent per week    Drug use: Never     Review of Systems   Constitutional: Negative.  Negative for chills, diaphoresis, fever, malaise/fatigue and weight loss.   HENT: Negative.  Negative for congestion, hearing loss and sore throat.    Eyes: Negative.    Respiratory:  Negative for cough, hemoptysis, sputum production, shortness of breath and wheezing.    Cardiovascular: Negative.  Negative for chest pain, palpitations, orthopnea, claudication, leg swelling and PND.   Gastrointestinal: Negative.    Genitourinary: Negative.    Musculoskeletal: Negative.    Skin: Negative.  Negative for itching and rash.   Neurological: Negative.   "  Endo/Heme/Allergies: Negative.    Psychiatric/Behavioral: Negative.     Vitals  /63 (BP Location: Left arm, Patient Position: Sitting, BP Method: Medium (Automatic))   Pulse 61   Temp 97.3 °F (36.3 °C) (Oral)   Ht 5' 8" (1.727 m)   Wt 76.2 kg (167 lb 15.9 oz)   SpO2 97% Comment: room air  BMI 25.54 kg/m²   Physical Exam  Constitutional:       Appearance: Normal appearance. He is normal weight.   HENT:      Head: Normocephalic and atraumatic.   Eyes:      Extraocular Movements: Extraocular movements intact.      Conjunctiva/sclera: Conjunctivae normal.   Cardiovascular:      Rate and Rhythm: Normal rate and regular rhythm.   Pulmonary:      Effort: Pulmonary effort is normal. No respiratory distress.      Breath sounds: Normal breath sounds. No stridor. No wheezing, rhonchi or rales.   Chest:      Chest wall: No tenderness.   Abdominal:      Palpations: Abdomen is soft.   Musculoskeletal:         General: No swelling. Normal range of motion.      Cervical back: Normal range of motion.      Right lower leg: No edema.      Left lower leg: No edema.   Skin:     General: Skin is warm and dry.      Capillary Refill: Capillary refill takes less than 2 seconds.   Neurological:      General: No focal deficit present.      Mental Status: He is alert and oriented to person, place, and time.   Psychiatric:         Mood and Affect: Mood normal.         Behavior: Behavior normal.         Thought Content: Thought content normal.         Judgment: Judgment normal.       Labs:  No visits with results within 7 Day(s) from this visit.   Latest known visit with results is:   Lab Visit on 03/14/2022   Component Date Value    WBC 03/14/2022 6.23     RBC 03/14/2022 5.09     Hemoglobin 03/14/2022 13.6 (L)     Hematocrit 03/14/2022 44.0     MCV 03/14/2022 86     MCH 03/14/2022 26.7 (L)     MCHC 03/14/2022 30.9 (L)     RDW 03/14/2022 12.8     Platelets 03/14/2022 302     MPV 03/14/2022 10.0     Immature Granulocytes 03/14/2022 " 0.3     Gran # (ANC) 03/14/2022 4.5     Immature Grans (Abs) 03/14/2022 0.02     Lymph # 03/14/2022 0.8 (L)     Mono # 03/14/2022 0.7     Eos # 03/14/2022 0.3     Baso # 03/14/2022 0.03     nRBC 03/14/2022 0     Gran % 03/14/2022 71.5     Lymph % 03/14/2022 12.5 (L)     Mono % 03/14/2022 10.9     Eosinophil % 03/14/2022 4.3     Basophil % 03/14/2022 0.5     Differential Method 03/14/2022 Automated     Sodium 03/14/2022 140     Potassium 03/14/2022 3.7     Chloride 03/14/2022 105     CO2 03/14/2022 26     Glucose 03/14/2022 92     BUN 03/14/2022 12     Creatinine 03/14/2022 0.9     Calcium 03/14/2022 9.7     Total Protein 03/14/2022 6.9     Albumin 03/14/2022 3.4 (L)     Total Bilirubin 03/14/2022 0.6     Alkaline Phosphatase 03/14/2022 76     AST 03/14/2022 19     ALT 03/14/2022 24     Anion Gap 03/14/2022 9     eGFR if African American 03/14/2022 >60.0     eGFR if non African Amer* 03/14/2022 >60.0     Free T4 03/14/2022 1.00     TSH 03/14/2022 1.469        Pulmonary Function Tests 10/20/2022 4/12/2022 6/28/2021   FVC 3.79 3.39 2.92   FEV1 3.32 3.08 2.58   TLC (liters) 4.49 4.15 -   DLCO (ml/mmHg sec) 23.3 15.53 16.22   FVC% 94 84 71   FEV1% 102 94 78   FEF 25-75 5.32 5.81 4.3   FEF 25-75% 167 181 133   TLC% 67 62 -   RV - 0.76 -   RV% - 38 -   DLCO% 77 51 53     6MW 10/20/2022 4/12/2022 6/28/2021   6MWT Status completed without stopping completed without stopping completed without stopping   Patient Reported No complaints No complaints Lightheadedness;Dizziness   Was O2 used? No No No   6MW Distance walked (feet) 1650 1500 1300   Distance walked (meters) 502.92 457.2 396.24   Did patient stop? No No No   Oxygen Saturation 98 97 98   Supplemental Oxygen Room Air Room Air Room Air   Heart Rate 57 66 85   Blood Pressure 115/72 109/75 129/82   Mark Dyspnea Rating  nothing at all nothing at all moderate   Oxygen Saturation 98 90 95   Supplemental Oxygen Room Air Room Air Room Air   Heart Rate 82 56 82   Blood Pressure  135/79 133/80 152/85   Mark Dyspnea Rating  very, very light (just noticeable) nothing at all light   Recovery Time (seconds) 99 128 96   Oxygen Saturation 97 98 97   Supplemental Oxygen Room Air Room Air Room Air   Heart Rate 73 55 74       Imaging:  Results for orders placed during the hospital encounter of 02/17/22    X-Ray Chest PA And Lateral    Narrative  EXAMINATION:  XR CHEST PA AND LATERAL    CLINICAL HISTORY:  Shortness of breath    FINDINGS:  Chest two views: Again seen are extensive heavy burden miliary nodules.  Heart size is normal.  Bones show no abnormality.    Impression  Stable extensive heavy burden diffuse miliary nodules.  Differential diagnosis very long but includes infection such as TB, fungal, and healed varicella pneumonia, metastases, sarcoid, silicosis, plus other pneumoconioses, hypersensitivity pneumonitis, Langerhans cell histiocytosis, plus other rare entities.      Electronically signed by: Renny Terry MD  Date:    02/17/2022  Time:    08:55        Cardiodiagnostics:  Results for orders placed during the hospital encounter of 06/28/21    Echo Color Flow Doppler? Yes    Interpretation Summary  · The left ventricle is normal in size with normal systolic function. The estimated ejection fraction is 60%.  · Normal right ventricular size with normal right ventricular systolic function.  · Normal left ventricular diastolic function.  · The estimated PA systolic pressure is 24 mmHg.  · Normal central venous pressure (3 mmHg).        Assessment:  1. Pulmonary sarcoidosis      Plan: Diagnosed via EBUS in 2020.    Chest CT from 10/2020 and CXR from 4/2022 both with heavy amount of diffuse miliary nodules.  Patient with improvement of spirometry and no desaturations on 6MWT, with a stable heart rate.  Has weaned himself off prednisone completely in July, with no issues of shortness of breath.  He saw cardiology in May, but was unable to afford the co-pay for an ECHO and holter monitor.  Will  ask Dr. Alcala to see him.     Works full time with no issues.  Overall, he is doing well.     RTC in 12 months with PFTs and 6MWT    Damaso Sylvester NP  Advanced Lung Disease

## 2022-10-20 NOTE — LETTER
October 20, 2022        Joanna Mendez  [address] [facility] [address]  [facility] [address]  Phone: [phone]  Fax: [phone]             [facility] 1st Fl  [address]  [facility] [address]  Phone: [phone]   Patient: Quinton Gutierrez Jr.   MR Number: [id number]   YOB: 1975   Date of Visit: 10/20/2022       Dear Dr. Joanna Mendez    Thank you for referring Quinton Gutierrez to me for evaluation. Attached you will find relevant portions of my assessment and plan of care.    If you have questions, please do not hesitate to call me. I look forward to following Quinton Gutierrez along with you.    Sincerely,    Chelo Mata MD    Enclosure    If you would like to receive this communication electronically, please contact [email or web address] or [phone] to request [employer] access.    [employer] is a tool which provides read-only access to select patient information with whom you have a relationship. Its easy to use and provides real time access to review your patients record including encounter summaries, notes, results, and demographic information.    If you feel you have received this communication in error or would no longer like to receive these types of communications, please e-mail [email or web address]

## 2022-10-22 NOTE — PROCEDURES
Quinton Gutierrez Jr. is a 47 y.o.  male patient, who presents for a 6 minute walk test ordered by MD Adolfo.  The diagnosis is Sarcoidosis.  The patient's BMI is 24.2 kg/m2.  Predicted distance (lower limit of normal) is 538.94 meters.      Test Results:    The test was completed without stopping.  The total time walked was 360 seconds.  During walking, the patient reported:  No complaints.  The patient used no assistive devices during testing.     10/20/2022---------Distance: 502.92 meters (1650 feet)     O2 Sat % Supplemental Oxygen Heart Rate Blood Pressure Mark Scale   Pre-exercise  (Resting) 98 % Room Air 57 bpm 115/72 mmHg 0   During Exercise 98 % Room Air 82 bpm 135/79 mmHg 0.5   Post-exercise  (Recovery) 97 % Room Air  73 bpm       Recovery Time: 49 seconds    Performing nurse/tech: Sabrina BROWN      PREVIOUS STUDY:   4/12/2022--------Distance: 457.2 meters (1500 feet)       O2 Sat % Supplemental Oxygen Heart Rate Blood Pressure Mark Scale   Pre-exercise  (Resting) 97 % Room Air 66 bpm 109/75 mmHg 0   During Exercise 90 % Room Air 56 bpm 133/80 mmHg 0   Post-exercise  (Recovery) 98 % Room Air  55 bpm 118/68 mmHg        CLINICAL INTERPRETATION:  Six minute walk distance is 502.92 meters (1650 feet) with very, very light dyspnea.  During exercise, there was no significant desaturation while breathing room air.  Blood pressure remained stable and Heart rate increased significantly with walking.  Bradycardia was present prior to exercise.  The patient did not report non-pulmonary symptoms during exercise.  Since the previous study in April 2022, exercise capacity may be somewhat improved.  Based upon age and body mass index, exercise capacity is less than predicted.

## 2022-10-26 ENCOUNTER — DOCUMENTATION ONLY (OUTPATIENT)
Dept: TRANSPLANT | Facility: CLINIC | Age: 47
End: 2022-10-26
Payer: COMMERCIAL

## 2022-10-27 ENCOUNTER — TELEPHONE (OUTPATIENT)
Dept: TRANSPLANT | Facility: CLINIC | Age: 47
End: 2022-10-27
Payer: COMMERCIAL

## 2022-10-27 DIAGNOSIS — I50.9 CONGESTIVE HEART FAILURE, UNSPECIFIED HF CHRONICITY, UNSPECIFIED HEART FAILURE TYPE: Primary | ICD-10-CM

## 2022-10-27 NOTE — PROGRESS NOTES
Message sent to Dr. Alcala's staff and heart transplant schedulers regarding scheduling an appointment with Dr. Alcala for evaluation for cardiac sarcoidosis per Dr. Mata's request.  
No

## 2022-11-10 ENCOUNTER — CLINICAL SUPPORT (OUTPATIENT)
Dept: OTHER | Facility: CLINIC | Age: 47
End: 2022-11-10
Payer: COMMERCIAL

## 2022-11-10 DIAGNOSIS — Z00.8 ENCOUNTER FOR OTHER GENERAL EXAMINATION: ICD-10-CM

## 2022-11-10 PROCEDURE — 99401 PR PREVENT COUNSEL,INDIV,15 MIN: ICD-10-PCS | Mod: NTX,S$GLB,, | Performed by: INTERNAL MEDICINE

## 2022-11-10 PROCEDURE — 80061 PR  LIPID PANEL: ICD-10-PCS | Mod: QW,NTX,S$GLB, | Performed by: INTERNAL MEDICINE

## 2022-11-10 PROCEDURE — 80061 LIPID PANEL: CPT | Mod: QW,NTX,S$GLB, | Performed by: INTERNAL MEDICINE

## 2022-11-10 PROCEDURE — 82947 ASSAY GLUCOSE BLOOD QUANT: CPT | Mod: QW,NTX,S$GLB, | Performed by: INTERNAL MEDICINE

## 2022-11-10 PROCEDURE — 99401 PREV MED CNSL INDIV APPRX 15: CPT | Mod: NTX,S$GLB,, | Performed by: INTERNAL MEDICINE

## 2022-11-10 PROCEDURE — 82947 PR  ASSAY QUANTITATIVE,BLOOD GLUCOSE: ICD-10-PCS | Mod: QW,NTX,S$GLB, | Performed by: INTERNAL MEDICINE

## 2022-11-11 VITALS
WEIGHT: 167 LBS | BODY MASS INDEX: 25.31 KG/M2 | DIASTOLIC BLOOD PRESSURE: 76 MMHG | HEIGHT: 68 IN | SYSTOLIC BLOOD PRESSURE: 118 MMHG

## 2022-11-11 LAB
GLUCOSE SERPL-MCNC: 81 MG/DL (ref 60–140)
HDLC SERPL-MCNC: 69 MG/DL
POC CHOLESTEROL, TOTAL: 183 MG/DL
TRIGL SERPL-MCNC: 44 MG/DL

## 2022-11-21 ENCOUNTER — HOSPITAL ENCOUNTER (OUTPATIENT)
Dept: RADIOLOGY | Facility: HOSPITAL | Age: 47
Discharge: HOME | End: 2022-11-21
Attending: INTERNAL MEDICINE
Payer: COMMERCIAL

## 2022-11-21 ENCOUNTER — HOSPITAL ENCOUNTER (OUTPATIENT)
Dept: CARDIOLOGY | Facility: HOSPITAL | Age: 47
Discharge: HOME | End: 2022-11-21
Attending: INTERNAL MEDICINE
Payer: COMMERCIAL

## 2022-11-21 ENCOUNTER — PATIENT MESSAGE (OUTPATIENT)
Dept: ADMINISTRATIVE | Facility: HOSPITAL | Age: 47
End: 2022-11-21
Payer: COMMERCIAL

## 2022-11-21 ENCOUNTER — OFFICE VISIT (OUTPATIENT)
Dept: TRANSPLANT | Facility: CLINIC | Age: 47
End: 2022-11-21
Attending: INTERNAL MEDICINE
Payer: COMMERCIAL

## 2022-11-21 ENCOUNTER — HOSPITAL ENCOUNTER (OUTPATIENT)
Dept: CARDIOLOGY | Facility: CLINIC | Age: 47
Discharge: HOME | End: 2022-11-21
Attending: INTERNAL MEDICINE
Payer: COMMERCIAL

## 2022-11-21 VITALS
WEIGHT: 173 LBS | HEIGHT: 67 IN | SYSTOLIC BLOOD PRESSURE: 125 MMHG | DIASTOLIC BLOOD PRESSURE: 80 MMHG | HEART RATE: 72 BPM | BODY MASS INDEX: 27.15 KG/M2

## 2022-11-21 VITALS
WEIGHT: 173.75 LBS | BODY MASS INDEX: 27.27 KG/M2 | HEIGHT: 67 IN | HEART RATE: 62 BPM | SYSTOLIC BLOOD PRESSURE: 117 MMHG | DIASTOLIC BLOOD PRESSURE: 66 MMHG

## 2022-11-21 DIAGNOSIS — D86.0 PULMONARY SARCOIDOSIS: Primary | ICD-10-CM

## 2022-11-21 DIAGNOSIS — D86.0 PULMONARY SARCOIDOSIS: ICD-10-CM

## 2022-11-21 LAB
ASCENDING AORTA: 2.95 CM
AV INDEX (PROSTH): 0.86
AV MEAN GRADIENT: 3 MMHG
AV PEAK GRADIENT: 5 MMHG
AV VALVE AREA: 3.9 CM2
AV VELOCITY RATIO: 0.86
BSA FOR ECHO PROCEDURE: 1.93 M2
CV ECHO LV RWT: 0.29 CM
DOP CALC AO PEAK VEL: 1.14 M/S
DOP CALC AO VTI: 20.99 CM
DOP CALC LVOT AREA: 4.6 CM2
DOP CALC LVOT DIAMETER: 2.41 CM
DOP CALC LVOT PEAK VEL: 0.98 M/S
DOP CALC LVOT STROKE VOLUME: 81.84 CM3
DOP CALCLVOT PEAK VEL VTI: 17.95 CM
E WAVE DECELERATION TIME: 197.88 MSEC
E/A RATIO: 1.69
E/E' RATIO: 8.19 M/S
ECHO LV POSTERIOR WALL: 0.76 CM (ref 0.6–1.1)
EJECTION FRACTION: 55 %
FRACTIONAL SHORTENING: 25 % (ref 28–44)
INTERVENTRICULAR SEPTUM: 0.62 CM (ref 0.6–1.1)
LA MAJOR: 4.15 CM
LA MINOR: 4.11 CM
LA WIDTH: 4.26 CM
LEFT ATRIUM SIZE: 3.55 CM
LEFT ATRIUM VOLUME INDEX MOD: 28.6 ML/M2
LEFT ATRIUM VOLUME INDEX: 27.9 ML/M2
LEFT ATRIUM VOLUME MOD: 54.39 CM3
LEFT ATRIUM VOLUME: 53.09 CM3
LEFT INTERNAL DIMENSION IN SYSTOLE: 3.99 CM (ref 2.1–4)
LEFT VENTRICLE DIASTOLIC VOLUME INDEX: 70.88 ML/M2
LEFT VENTRICLE DIASTOLIC VOLUME: 134.67 ML
LEFT VENTRICLE MASS INDEX: 65 G/M2
LEFT VENTRICLE SYSTOLIC VOLUME INDEX: 36.5 ML/M2
LEFT VENTRICLE SYSTOLIC VOLUME: 69.43 ML
LEFT VENTRICULAR INTERNAL DIMENSION IN DIASTOLE: 5.29 CM (ref 3.5–6)
LEFT VENTRICULAR MASS: 124.32 G
LV LATERAL E/E' RATIO: 7.17 M/S
LV SEPTAL E/E' RATIO: 9.56 M/S
MV A" WAVE DURATION": 9.99 MSEC
MV PEAK A VEL: 0.51 M/S
MV PEAK E VEL: 0.86 M/S
MV STENOSIS PRESSURE HALF TIME: 57.39 MS
MV VALVE AREA P 1/2 METHOD: 3.83 CM2
PULM VEIN S/D RATIO: 1.01
PV PEAK D VEL: 0.69 M/S
PV PEAK S VEL: 0.7 M/S
RA MAJOR: 4.04 CM
RA PRESSURE: 8 MMHG
RA WIDTH: 4.58 CM
RIGHT VENTRICULAR END-DIASTOLIC DIMENSION: 4.74 CM
RV TISSUE DOPPLER FREE WALL SYSTOLIC VELOCITY 1 (APICAL 4 CHAMBER VIEW): 12.82 CM/S
SINUS: 3.23 CM
STJ: 2.98 CM
TDI LATERAL: 0.12 M/S
TDI SEPTAL: 0.09 M/S
TDI: 0.11 M/S
TRICUSPID ANNULAR PLANE SYSTOLIC EXCURSION: 2.42 CM

## 2022-11-21 PROCEDURE — 1160F PR REVIEW ALL MEDS BY PRESCRIBER/CLIN PHARMACIST DOCUMENTED: ICD-10-PCS | Mod: CPTII,NTX,S$GLB, | Performed by: INTERNAL MEDICINE

## 2022-11-21 PROCEDURE — 93010 ELECTROCARDIOGRAM REPORT: CPT | Mod: NTX,S$GLB,, | Performed by: INTERNAL MEDICINE

## 2022-11-21 PROCEDURE — 93306 TTE W/DOPPLER COMPLETE: CPT | Mod: NTX

## 2022-11-21 PROCEDURE — 71046 X-RAY EXAM CHEST 2 VIEWS: CPT | Mod: TC,FY,TXP

## 2022-11-21 PROCEDURE — 3078F DIAST BP <80 MM HG: CPT | Mod: CPTII,NTX,S$GLB, | Performed by: INTERNAL MEDICINE

## 2022-11-21 PROCEDURE — 1160F RVW MEDS BY RX/DR IN RCRD: CPT | Mod: CPTII,NTX,S$GLB, | Performed by: INTERNAL MEDICINE

## 2022-11-21 PROCEDURE — 93306 ECHO (CUPID ONLY): ICD-10-PCS | Mod: 26,NTX,, | Performed by: INTERNAL MEDICINE

## 2022-11-21 PROCEDURE — 93306 TTE W/DOPPLER COMPLETE: CPT | Mod: 26,NTX,, | Performed by: INTERNAL MEDICINE

## 2022-11-21 PROCEDURE — 99999 PR PBB SHADOW E&M-EST. PATIENT-LVL IV: CPT | Mod: PBBFAC,TXP,, | Performed by: INTERNAL MEDICINE

## 2022-11-21 PROCEDURE — 99214 OFFICE O/P EST MOD 30 MIN: CPT | Mod: NTX,S$GLB,, | Performed by: INTERNAL MEDICINE

## 2022-11-21 PROCEDURE — 3008F BODY MASS INDEX DOCD: CPT | Mod: CPTII,NTX,S$GLB, | Performed by: INTERNAL MEDICINE

## 2022-11-21 PROCEDURE — 3074F PR MOST RECENT SYSTOLIC BLOOD PRESSURE < 130 MM HG: ICD-10-PCS | Mod: CPTII,NTX,S$GLB, | Performed by: INTERNAL MEDICINE

## 2022-11-21 PROCEDURE — 1159F MED LIST DOCD IN RCRD: CPT | Mod: CPTII,NTX,S$GLB, | Performed by: INTERNAL MEDICINE

## 2022-11-21 PROCEDURE — 93005 EKG 12-LEAD: ICD-10-PCS | Mod: NTX,S$GLB,, | Performed by: INTERNAL MEDICINE

## 2022-11-21 PROCEDURE — 3008F PR BODY MASS INDEX (BMI) DOCUMENTED: ICD-10-PCS | Mod: CPTII,NTX,S$GLB, | Performed by: INTERNAL MEDICINE

## 2022-11-21 PROCEDURE — 71046 X-RAY EXAM CHEST 2 VIEWS: CPT | Mod: 26,NTX,, | Performed by: RADIOLOGY

## 2022-11-21 PROCEDURE — 3078F PR MOST RECENT DIASTOLIC BLOOD PRESSURE < 80 MM HG: ICD-10-PCS | Mod: CPTII,NTX,S$GLB, | Performed by: INTERNAL MEDICINE

## 2022-11-21 PROCEDURE — 71046 XR CHEST PA AND LATERAL: ICD-10-PCS | Mod: 26,NTX,, | Performed by: RADIOLOGY

## 2022-11-21 PROCEDURE — 99999 PR PBB SHADOW E&M-EST. PATIENT-LVL IV: ICD-10-PCS | Mod: PBBFAC,TXP,, | Performed by: INTERNAL MEDICINE

## 2022-11-21 PROCEDURE — 3074F SYST BP LT 130 MM HG: CPT | Mod: CPTII,NTX,S$GLB, | Performed by: INTERNAL MEDICINE

## 2022-11-21 PROCEDURE — 93005 ELECTROCARDIOGRAM TRACING: CPT | Mod: NTX,S$GLB,, | Performed by: INTERNAL MEDICINE

## 2022-11-21 PROCEDURE — 99214 PR OFFICE/OUTPT VISIT, EST, LEVL IV, 30-39 MIN: ICD-10-PCS | Mod: NTX,S$GLB,, | Performed by: INTERNAL MEDICINE

## 2022-11-21 PROCEDURE — 93010 EKG 12-LEAD: ICD-10-PCS | Mod: NTX,S$GLB,, | Performed by: INTERNAL MEDICINE

## 2022-11-21 PROCEDURE — 1159F PR MEDICATION LIST DOCUMENTED IN MEDICAL RECORD: ICD-10-PCS | Mod: CPTII,NTX,S$GLB, | Performed by: INTERNAL MEDICINE

## 2022-11-21 NOTE — LETTER
November 25, 2022        Joanna Mendez  [address] [place] [address]  Phone: [phone]  Fax: [phone]             [facility]  [address]  [place] [address]  Phone: [phone]   Patient: Quinton Gutierrez Jr.   MR Number: [id number]   YOB: 1975   Date of Visit: 11/21/2022       Dear Dr. Joanna Mendez    Thank you for referring Quinton Gutierrez to me for evaluation. Attached you will find relevant portions of my assessment and plan of care.    If you have questions, please do not hesitate to call me. I look forward to following Quinton Gutierrez along with you.    Sincerely,    Lloyd Alcala Jr, MD    Enclosure    If you would like to receive this communication electronically, please contact [email or web address] or [phone] to request [employer] Link access.    [employer] Link is a tool which provides read-only access to select patient information with whom you have a relationship. Its easy to use and provides real time access to review your patients record including encounter summaries, notes, results, and demographic information.    If you feel you have received this communication in error or would no longer like to receive these types of communications, please e-mail [email or web address]

## 2022-11-25 NOTE — PROGRESS NOTES
Subjective:     Patient ID:  Quinton Gutierrez Jr. is a 47 y.o. male who presents for evaluation of Sarcoidosis    HPI:  47-year-old gentleman has known pulmonary sarcoidosis follow up with Dr. De Los Santos in Pulmonary Medicine and Dr. Mata and lung transplant/interstitial lung disease section.  He was referred to establish care for assessment of any possible cardiac sarcoidosis.  No tightness, pressure or heaviness in chest, neck, arms, throat, jaw or back with or without exertion.  No MONTANO, orthopnea, PND, palpitations, pre-syncope or syncope.    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    He has occasional alcohol.  He is never smoked.  No illicit drug use.    Only known past medical history is sarcoidosis.  He is never had any surgical procedures.  Only medication was prednisone which he takes for the sarcoidosis but has been off since July 2022.    Past Medical History:   Diagnosis Date    Sarcoidosis, unspecified        Past Surgical History:   Procedure Laterality Date    ENDOBRONCHIAL ULTRASOUND N/A 10/1/2020    Procedure: ENDOBRONCHIAL ULTRASOUND (EBUS);  Surgeon: Selene De Los Santos MD;  Location: [facility] OR [address] [place] [address];  Service: Pulmonary;  Laterality: N/A;       Family History   Problem Relation Age of Onset    Colon cancer Neg Hx     Breast cancer Neg Hx     Prostate cancer Neg Hx     Heart attacks under age 50 Neg Hx        Social History     Socioeconomic History    Marital status:    Tobacco Use    Smoking status: Never     Passive exposure: Past    Smokeless tobacco: Never   Substance and Sexual Activity    Alcohol use: Yes     Alcohol/week: 2.0 standard drinks     Types: 2 Standard drinks or equivalent per week    Drug use: Never       MEDICATIONS:  Prednisone is on his medicine list but he is not taking it has been off since July 2022.     Review of patient's allergies indicates:  No Known Allergies    Review of Systems   Constitutional: Negative  "for chills, fever, malaise/fatigue, weight gain and weight loss.   Eyes:  Negative for visual disturbance.   Cardiovascular:  Negative for chest pain, dyspnea on exertion, leg swelling, near-syncope, orthopnea, palpitations, paroxysmal nocturnal dyspnea and syncope.   Respiratory:  Negative for cough, shortness of breath, sputum production and wheezing.    Hematologic/Lymphatic: Does not bruise/bleed easily.   Skin:  Negative for rash.   Musculoskeletal:  Negative for arthritis and muscle weakness.   Gastrointestinal:  Negative for change in bowel habit, hematochezia, melena, nausea and vomiting.   Genitourinary:  Negative for hematuria.   Neurological:  Negative for brief paralysis, dizziness, focal weakness, light-headedness and weakness.      Objective:   Physical Exam  Constitutional:       General: He is not in acute distress.     Appearance: He is well-developed. He is not ill-appearing, toxic-appearing or diaphoretic.      Comments: /66 (BP Location: Left arm, Patient Position: Sitting, BP Method: Large (Automatic))   Pulse 62   Ht 5' 7" (1.702 m)   Wt 78.8 kg (173 lb 11.6 oz)   BMI 27.21 kg/m²   Well-developed, well-nourished black male in no acute distress.  Friendly and cooperative gives excellent history   HENT:      Head: Normocephalic and atraumatic.   Eyes:      General: No scleral icterus.        Right eye: No discharge.         Left eye: No discharge.      Conjunctiva/sclera: Conjunctivae normal.   Neck:      Thyroid: No thyromegaly.      Vascular: No JVD.      Trachea: No tracheal deviation.   Cardiovascular:      Rate and Rhythm: Normal rate and regular rhythm.      Heart sounds: Normal heart sounds. No murmur heard.    No gallop.   Pulmonary:      Effort: Pulmonary effort is normal.      Breath sounds: Normal breath sounds.      Comments: I do not hear any dry crackles today, breath sounds are normal  Abdominal:      General: Bowel sounds are normal. There is no distension.      " Palpations: Abdomen is soft. There is no mass.      Tenderness: There is no abdominal tenderness. There is no guarding or rebound.   Musculoskeletal:         General: No swelling or tenderness.      Right lower leg: No edema.      Left lower leg: No edema.   Skin:     General: Skin is warm and dry.   Neurological:      General: No focal deficit present.      Mental Status: He is alert and oriented to person, place, and time. Mental status is at baseline.   Psychiatric:         Mood and Affect: Mood normal.         Behavior: Behavior normal.         Thought Content: Thought content normal.         Judgment: Judgment normal.      11/21/22 CXR   1. Symmetric, subtle miliary nodules throughout the bilateral lung fields that is significantly improved when compared to prior however, it is unclear whether findings are residual or recurrent and clinical correlation is requested.  Otherwise, no appreciable new acute cardiopulmonary process appreciated.     11/21/2022 ECHO  The left ventricle is normal in size with normal systolic function. The estimated ejection fraction is 55%.  Mild right ventricular enlargement with normal right ventricular systolic function.  I reviewed the echo and I am less convinced over the size of the right ventricle being enlarged.  IVC dynamics are consistent with normal central venous pressure.  Normal left ventricular diastolic function.  Intermediate central venous pressure (8 mmHg).    11/21/2022 EKG SINUS BRADYCARDIA OTHERWISE NORMAL EKG    Lab Results   Component Value Date    BNP 19 11/21/2022     11/21/2022    K 3.7 11/21/2022    MG 1.9 11/21/2022     11/21/2022    CO2 24 11/21/2022    BUN 15 11/21/2022    CREATININE 1.0 11/21/2022     11/21/2022    HGBA1C 4.8 08/03/2021    AST 22 11/21/2022    ALT 23 11/21/2022    ALBUMIN 3.8 11/21/2022    PROT 6.9 11/21/2022    BILITOT 0.8 11/21/2022    WBC 6.23 03/14/2022    HGB 13.6 (L) 03/14/2022    HCT 44.0 03/14/2022      03/14/2022     09/28/2020    TSH 1.239 11/21/2022    FREET4 1.00 03/14/2022    CHOL 220 (H) 08/03/2021    HDL 71 08/03/2021    LDLCALC 126.6 08/03/2021    TRIG 112 08/03/2021     Lab Results   Component Value Date    BNP 19 11/21/2022    BNP <10 09/27/2020 11/21/2022 NT-proBNP  <=87 pg/mL 50      Assessment:     1. Pulmonary sarcoidosis      Plan:   Refer to rheumatology as part of our multidisciplinary sarcoid program  Get baseline echo and EKG, NT pro and BNP today and otherwise when he is able  Follow-up on chest x-ray and include PFTs under my name so I can get copies for my perusal of the ones obtained by Dr. Mata  If all OK can f/u yearly with EKG and NT proBNP    11/25/2022 review of results by phone  I reviewed the echocardiogram with the patient told him I think the right ventricle is normal in size.  In any event, since function is normal and there is no evidence of the right heart not working well nothing different to do.  I see no evidence of the heart is involved with his sarcoidosis.  I would like to get a yearly NT proBNP and EKG.  Warning signs symptoms reviewed that result in him needing to come sooner such as feeling like he is passing out, passing out, palpitations, shortness breath.

## 2023-01-25 ENCOUNTER — PATIENT MESSAGE (OUTPATIENT)
Dept: ADMINISTRATIVE | Facility: HOSPITAL | Age: 48
End: 2023-01-25
Payer: COMMERCIAL

## 2023-09-18 DIAGNOSIS — D86.0 PULMONARY SARCOIDOSIS: Primary | ICD-10-CM

## 2024-11-25 ENCOUNTER — PATIENT MESSAGE (OUTPATIENT)
Dept: TRANSPLANT | Facility: CLINIC | Age: 49
End: 2024-11-25
Payer: COMMERCIAL

## 2025-08-28 ENCOUNTER — PATIENT MESSAGE (OUTPATIENT)
Dept: PRIMARY CARE CLINIC | Facility: CLINIC | Age: 50
End: 2025-08-28
Payer: COMMERCIAL

## (undated) DEVICE — GAUZE SPONGE 4X4 12PLY

## (undated) DEVICE — ADAPTER SWIVEL

## (undated) DEVICE — SEE MEDLINE ITEM 157131

## (undated) DEVICE — FORCEP JAW RADIAL PULM 2X100CM

## (undated) DEVICE — CYTOSPIN COLLECTION FLUID BLT

## (undated) DEVICE — NDL VIZISHOT 2 FLEX 19G

## (undated) DEVICE — CONTAINER SPECIMEN STRL 4OZ

## (undated) DEVICE — ALCOHOL BLEND 95%

## (undated) DEVICE — SEE MEDLINE ITEM 146313

## (undated) DEVICE — LUBRICANT SURGILUBE 2 OZ

## (undated) DEVICE — PENCIL GOLF STERILE

## (undated) DEVICE — SEE MEDLINE ITEM 146416

## (undated) DEVICE — SEE MEDLINE ITEM 152487

## (undated) DEVICE — SYS LABEL CORRECT MED

## (undated) DEVICE — PACK SET UP CONVERTORS

## (undated) DEVICE — SYR 10CC LUER LOCK

## (undated) DEVICE — CATH BRONCHOSCOPE F/BF

## (undated) DEVICE — SEE MEDLINE ITEM 157117

## (undated) DEVICE — SYR SLIP TIP 20CC